# Patient Record
Sex: MALE | Race: WHITE | NOT HISPANIC OR LATINO
[De-identification: names, ages, dates, MRNs, and addresses within clinical notes are randomized per-mention and may not be internally consistent; named-entity substitution may affect disease eponyms.]

---

## 2017-01-06 ENCOUNTER — RX RENEWAL (OUTPATIENT)
Age: 77
End: 2017-01-06

## 2017-01-09 ENCOUNTER — APPOINTMENT (OUTPATIENT)
Dept: INFUSION THERAPY | Facility: CLINIC | Age: 77
End: 2017-01-09

## 2017-01-16 ENCOUNTER — OTHER (OUTPATIENT)
Age: 77
End: 2017-01-16

## 2017-01-16 LAB
BASOPHILS # BLD: 0.03 TH/MM3
BASOPHILS NFR BLD: 0.4 %
EOSINOPHIL # BLD: 0.09 TH/MM3
EOSINOPHIL NFR BLD: 1.3 %
ERYTHROCYTE [DISTWIDTH] IN BLOOD BY AUTOMATED COUNT: 12.7 %
GRANULOCYTES # BLD: 5.08 TH/MM3
GRANULOCYTES NFR BLD: 73.4 %
HCT VFR BLD AUTO: 43.4 %
HGB BLD-MCNC: 15.7 G/DL
IMM GRANULOCYTES # BLD: 0.04 TH/MM3
IMM GRANULOCYTES NFR BLD: 0.6 %
LYMPHOCYTES # BLD: 0.78 TH/MM3
LYMPHOCYTES NFR BLD: 11.3 %
MCH RBC QN AUTO: 44.7 PG
MCHC RBC AUTO-ENTMCNC: 36.2 G/DL
MCV RBC AUTO: 123.6 FL
MONOCYTES # BLD: 0.9 TH/MM3
MONOCYTES NFR BLD: 13 %
PLATELET # BLD: 183 TH/MM3
PMV BLD AUTO: 10.2 FL
RBC # BLD AUTO: 3.51 MIL/MM3
WBC # BLD: 6.92 TH/MM3

## 2017-03-07 ENCOUNTER — APPOINTMENT (OUTPATIENT)
Dept: HEMATOLOGY ONCOLOGY | Facility: CLINIC | Age: 77
End: 2017-03-07

## 2017-03-07 ENCOUNTER — OUTPATIENT (OUTPATIENT)
Dept: OUTPATIENT SERVICES | Facility: HOSPITAL | Age: 77
LOS: 1 days | Discharge: HOME | End: 2017-03-07

## 2017-03-07 VITALS
TEMPERATURE: 97.5 F | HEART RATE: 68 BPM | DIASTOLIC BLOOD PRESSURE: 66 MMHG | BODY MASS INDEX: 27.46 KG/M2 | HEIGHT: 74 IN | RESPIRATION RATE: 14 BRPM | SYSTOLIC BLOOD PRESSURE: 121 MMHG | WEIGHT: 214 LBS

## 2017-03-07 LAB
BASOPHILS # BLD: 0.01 TH/MM3
BASOPHILS NFR BLD: 0.2 %
EOSINOPHIL # BLD: 0.08 TH/MM3
EOSINOPHIL NFR BLD: 1.6 %
ERYTHROCYTE [DISTWIDTH] IN BLOOD BY AUTOMATED COUNT: 12.7 %
GRANULOCYTES # BLD: 3.59 TH/MM3
GRANULOCYTES NFR BLD: 70.5 %
HCT VFR BLD AUTO: 37.7 %
HGB BLD-MCNC: 13.7 G/DL
IMM GRANULOCYTES # BLD: 0.02 TH/MM3
IMM GRANULOCYTES NFR BLD: 0.4 %
LYMPHOCYTES # BLD: 0.79 TH/MM3
LYMPHOCYTES NFR BLD: 15.5 %
MCH RBC QN AUTO: 46.6 PG
MCHC RBC AUTO-ENTMCNC: 36.3 G/DL
MCV RBC AUTO: 128.2 FL
MONOCYTES # BLD: 0.6 TH/MM3
MONOCYTES NFR BLD: 11.8 %
PLATELET # BLD: 283 TH/MM3
PMV BLD AUTO: 9.8 FL
RBC # BLD AUTO: 2.94 MIL/MM3
WBC # BLD: 5.09 TH/MM3

## 2017-03-08 ENCOUNTER — OTHER (OUTPATIENT)
Age: 77
End: 2017-03-08

## 2017-03-08 LAB
ALBUMIN SERPL-MCNC: 3.9 G/DL
ALBUMIN/GLOB SERPL: 1.18
ALP SERPL-CCNC: 67 IU/L
ALT SERPL-CCNC: 19 IU/L
ANION GAP SERPL CALC-SCNC: 13 MEQ/L
AST SERPL-CCNC: 23 IU/L
BILIRUB SERPL-MCNC: 1.1 MG/DL
BUN SERPL-MCNC: 22 MG/DL
BUN/CREAT SERPL: 12.5 %
CALCIUM SERPL-MCNC: 8.9 MG/DL
CHLORIDE SERPL-SCNC: 101 MEQ/L
CO2 SERPL-SCNC: 27 MEQ/L
CREAT SERPL-MCNC: 1.76 MG/DL
FERRITIN SERPL-MCNC: 206 NG/ML
GFR SERPL CREATININE-BSD FRML MDRD: 38
GLUCOSE SERPL-MCNC: 79 MG/DL
POTASSIUM SERPL-SCNC: 4.8 MMOL/L
PROT SERPL-MCNC: 7.2 G/DL
SODIUM SERPL-SCNC: 141 MEQ/L
VIT B12 SERPL-MCNC: 383 PG/ML

## 2017-05-15 ENCOUNTER — APPOINTMENT (OUTPATIENT)
Dept: CARDIOLOGY | Facility: CLINIC | Age: 77
End: 2017-05-15

## 2017-05-28 ENCOUNTER — RESULT REVIEW (OUTPATIENT)
Age: 77
End: 2017-05-28

## 2017-05-30 ENCOUNTER — APPOINTMENT (OUTPATIENT)
Dept: HEMATOLOGY ONCOLOGY | Facility: CLINIC | Age: 77
End: 2017-05-30

## 2017-05-30 ENCOUNTER — OTHER (OUTPATIENT)
Age: 77
End: 2017-05-30

## 2017-05-30 VITALS
HEIGHT: 74 IN | RESPIRATION RATE: 14 BRPM | TEMPERATURE: 96.6 F | DIASTOLIC BLOOD PRESSURE: 90 MMHG | BODY MASS INDEX: 26.95 KG/M2 | HEART RATE: 63 BPM | WEIGHT: 210 LBS | SYSTOLIC BLOOD PRESSURE: 141 MMHG

## 2017-05-30 LAB
BASOPHILS # BLD: 0.02 TH/MM3
BASOPHILS NFR BLD: 0.4 %
EOSINOPHIL # BLD: 0.13 TH/MM3
EOSINOPHIL NFR BLD: 2.3 %
ERYTHROCYTE [DISTWIDTH] IN BLOOD BY AUTOMATED COUNT: 11.2 %
GRANULOCYTES # BLD: 4.21 TH/MM3
GRANULOCYTES NFR BLD: 74 %
HCT VFR BLD AUTO: 42.4 %
HGB BLD-MCNC: 15.5 G/DL
IMM GRANULOCYTES # BLD: 0.04 TH/MM3
IMM GRANULOCYTES NFR BLD: 0.7 %
LYMPHOCYTES # BLD: 0.72 TH/MM3
LYMPHOCYTES NFR BLD: 12.7 %
MCH RBC QN AUTO: 46 PG
MCHC RBC AUTO-ENTMCNC: 36.6 G/DL
MCV RBC AUTO: 125.8 FL
MONOCYTES # BLD: 0.56 TH/MM3
MONOCYTES NFR BLD: 9.9 %
PLATELET # BLD: 272 TH/MM3
PMV BLD AUTO: 9.8 FL
RBC # BLD AUTO: 3.37 MIL/MM3
WBC # BLD: 5.68 TH/MM3

## 2017-06-02 ENCOUNTER — APPOINTMENT (OUTPATIENT)
Dept: INFUSION THERAPY | Facility: CLINIC | Age: 77
End: 2017-06-02

## 2017-06-02 ENCOUNTER — OUTPATIENT (OUTPATIENT)
Dept: OUTPATIENT SERVICES | Facility: HOSPITAL | Age: 77
LOS: 1 days | Discharge: HOME | End: 2017-06-02

## 2017-06-05 LAB
HEMOCCULT SP1 STL QL: NEGATIVE
HEMOCCULT SP2 STL QL: NEGATIVE
HEMOCCULT SP3 STL QL: NEGATIVE

## 2017-06-12 ENCOUNTER — EMERGENCY (EMERGENCY)
Facility: HOSPITAL | Age: 77
LOS: 0 days | Discharge: HOME | End: 2017-06-12

## 2017-06-28 DIAGNOSIS — R10.32 LEFT LOWER QUADRANT PAIN: ICD-10-CM

## 2017-06-28 DIAGNOSIS — Z79.82 LONG TERM (CURRENT) USE OF ASPIRIN: ICD-10-CM

## 2017-06-28 DIAGNOSIS — K52.9 NONINFECTIVE GASTROENTERITIS AND COLITIS, UNSPECIFIED: ICD-10-CM

## 2017-06-28 DIAGNOSIS — Z79.02 LONG TERM (CURRENT) USE OF ANTITHROMBOTICS/ANTIPLATELETS: ICD-10-CM

## 2017-06-28 DIAGNOSIS — E83.119 HEMOCHROMATOSIS, UNSPECIFIED: ICD-10-CM

## 2017-06-28 DIAGNOSIS — Z79.899 OTHER LONG TERM (CURRENT) DRUG THERAPY: ICD-10-CM

## 2017-07-17 ENCOUNTER — OUTPATIENT (OUTPATIENT)
Dept: OUTPATIENT SERVICES | Facility: HOSPITAL | Age: 77
LOS: 1 days | Discharge: HOME | End: 2017-07-17

## 2017-07-17 DIAGNOSIS — R19.7 DIARRHEA, UNSPECIFIED: ICD-10-CM

## 2017-10-12 ENCOUNTER — RX RENEWAL (OUTPATIENT)
Age: 77
End: 2017-10-12

## 2017-10-24 ENCOUNTER — OUTPATIENT (OUTPATIENT)
Dept: OUTPATIENT SERVICES | Facility: HOSPITAL | Age: 77
LOS: 1 days | Discharge: HOME | End: 2017-10-24

## 2017-10-24 DIAGNOSIS — E78.5 HYPERLIPIDEMIA, UNSPECIFIED: ICD-10-CM

## 2017-10-30 ENCOUNTER — APPOINTMENT (OUTPATIENT)
Dept: HEMATOLOGY ONCOLOGY | Facility: CLINIC | Age: 77
End: 2017-10-30

## 2017-10-30 ENCOUNTER — OUTPATIENT (OUTPATIENT)
Dept: OUTPATIENT SERVICES | Facility: HOSPITAL | Age: 77
LOS: 1 days | Discharge: HOME | End: 2017-10-30

## 2017-10-30 VITALS
WEIGHT: 203 LBS | TEMPERATURE: 97.7 F | DIASTOLIC BLOOD PRESSURE: 73 MMHG | BODY MASS INDEX: 26.05 KG/M2 | HEART RATE: 59 BPM | RESPIRATION RATE: 14 BRPM | SYSTOLIC BLOOD PRESSURE: 165 MMHG | HEIGHT: 74 IN

## 2017-10-31 DIAGNOSIS — E83.119 HEMOCHROMATOSIS, UNSPECIFIED: ICD-10-CM

## 2018-04-04 DIAGNOSIS — E83.119 HEMOCHROMATOSIS, UNSPECIFIED: ICD-10-CM

## 2018-04-24 ENCOUNTER — APPOINTMENT (OUTPATIENT)
Dept: HEMATOLOGY ONCOLOGY | Facility: CLINIC | Age: 78
End: 2018-04-24

## 2018-04-24 ENCOUNTER — LABORATORY RESULT (OUTPATIENT)
Age: 78
End: 2018-04-24

## 2018-04-24 VITALS
SYSTOLIC BLOOD PRESSURE: 138 MMHG | DIASTOLIC BLOOD PRESSURE: 68 MMHG | TEMPERATURE: 97 F | HEART RATE: 62 BPM | RESPIRATION RATE: 14 BRPM

## 2018-04-24 LAB
HCT VFR BLD CALC: 41.9 %
HGB BLD-MCNC: 14.5 G/DL
MCHC RBC-ENTMCNC: 34.6 G/DL
MCHC RBC-ENTMCNC: 42.3 PG
MCV RBC AUTO: 122.2 FL
PLATELET # BLD AUTO: 723 K/UL
PMV BLD: 9.1 FL
RBC # BLD: 3.43 M/UL
RBC # FLD: 12.2 %
WBC # FLD AUTO: 6.64 K/UL

## 2018-04-26 LAB — FERRITIN SERPL-MCNC: 341 NG/ML

## 2018-04-30 ENCOUNTER — OUTPATIENT (OUTPATIENT)
Dept: OUTPATIENT SERVICES | Facility: HOSPITAL | Age: 78
LOS: 1 days | Discharge: HOME | End: 2018-04-30

## 2018-04-30 DIAGNOSIS — E83.119 HEMOCHROMATOSIS, UNSPECIFIED: ICD-10-CM

## 2018-05-30 ENCOUNTER — OUTPATIENT (OUTPATIENT)
Dept: OUTPATIENT SERVICES | Facility: HOSPITAL | Age: 78
LOS: 1 days | Discharge: HOME | End: 2018-05-30

## 2018-05-30 DIAGNOSIS — E83.119 HEMOCHROMATOSIS, UNSPECIFIED: ICD-10-CM

## 2018-06-12 ENCOUNTER — RX RENEWAL (OUTPATIENT)
Age: 78
End: 2018-06-12

## 2018-06-26 ENCOUNTER — LABORATORY RESULT (OUTPATIENT)
Age: 78
End: 2018-06-26

## 2018-06-26 ENCOUNTER — APPOINTMENT (OUTPATIENT)
Dept: HEMATOLOGY ONCOLOGY | Facility: CLINIC | Age: 78
End: 2018-06-26

## 2018-06-26 ENCOUNTER — OUTPATIENT (OUTPATIENT)
Dept: OUTPATIENT SERVICES | Facility: HOSPITAL | Age: 78
LOS: 1 days | Discharge: HOME | End: 2018-06-26

## 2018-06-26 ENCOUNTER — OTHER (OUTPATIENT)
Age: 78
End: 2018-06-26

## 2018-06-26 VITALS
SYSTOLIC BLOOD PRESSURE: 127 MMHG | HEIGHT: 74 IN | WEIGHT: 207 LBS | HEART RATE: 64 BPM | BODY MASS INDEX: 26.56 KG/M2 | DIASTOLIC BLOOD PRESSURE: 65 MMHG | RESPIRATION RATE: 14 BRPM | TEMPERATURE: 98.1 F

## 2018-06-26 DIAGNOSIS — E83.119 HEMOCHROMATOSIS, UNSPECIFIED: ICD-10-CM

## 2018-06-27 LAB
FERRITIN SERPL-MCNC: 98 NG/ML
HCT VFR BLD CALC: 43.8 %
HGB BLD-MCNC: 15.3 G/DL
MCHC RBC-ENTMCNC: 34.9 G/DL
MCHC RBC-ENTMCNC: 42.7 PG
MCV RBC AUTO: 122.3 FL
PLATELET # BLD AUTO: 591 K/UL
PMV BLD: 9.7 FL
RBC # BLD: 3.58 M/UL
RBC # FLD: 13.2 %
WBC # FLD AUTO: 5.5 K/UL

## 2018-07-11 ENCOUNTER — OUTPATIENT (OUTPATIENT)
Dept: OUTPATIENT SERVICES | Facility: HOSPITAL | Age: 78
LOS: 1 days | Discharge: HOME | End: 2018-07-11

## 2018-07-11 DIAGNOSIS — Z00.00 ENCOUNTER FOR GENERAL ADULT MEDICAL EXAMINATION WITHOUT ABNORMAL FINDINGS: ICD-10-CM

## 2018-09-17 ENCOUNTER — LABORATORY RESULT (OUTPATIENT)
Age: 78
End: 2018-09-17

## 2018-09-17 ENCOUNTER — APPOINTMENT (OUTPATIENT)
Dept: HEMATOLOGY ONCOLOGY | Facility: CLINIC | Age: 78
End: 2018-09-17

## 2018-09-17 ENCOUNTER — OUTPATIENT (OUTPATIENT)
Dept: OUTPATIENT SERVICES | Facility: HOSPITAL | Age: 78
LOS: 1 days | Discharge: HOME | End: 2018-09-17

## 2018-09-17 VITALS
HEART RATE: 65 BPM | RESPIRATION RATE: 14 BRPM | TEMPERATURE: 96.9 F | DIASTOLIC BLOOD PRESSURE: 74 MMHG | HEIGHT: 74 IN | WEIGHT: 207 LBS | BODY MASS INDEX: 26.56 KG/M2 | SYSTOLIC BLOOD PRESSURE: 131 MMHG

## 2018-09-18 DIAGNOSIS — E83.119 HEMOCHROMATOSIS, UNSPECIFIED: ICD-10-CM

## 2018-09-18 LAB
HCT VFR BLD CALC: 43.6 %
HGB BLD-MCNC: 15.4 G/DL
INR PPP: 1.1 RATIO
MCHC RBC-ENTMCNC: 35.3 G/DL
MCHC RBC-ENTMCNC: 42.8 PG
MCV RBC AUTO: 121.1 FL
PLATELET # BLD AUTO: 563 K/UL
PMV BLD: 9.7 FL
PT BLD: 11.9 SEC
RBC # BLD: 3.6 M/UL
RBC # FLD: 12.6 %
WBC # FLD AUTO: 6.15 K/UL

## 2018-12-18 ENCOUNTER — LABORATORY RESULT (OUTPATIENT)
Age: 78
End: 2018-12-18

## 2018-12-18 ENCOUNTER — APPOINTMENT (OUTPATIENT)
Dept: HEMATOLOGY ONCOLOGY | Facility: CLINIC | Age: 78
End: 2018-12-18

## 2018-12-18 VITALS
HEART RATE: 73 BPM | WEIGHT: 200 LBS | RESPIRATION RATE: 14 BRPM | TEMPERATURE: 97.1 F | BODY MASS INDEX: 25.67 KG/M2 | DIASTOLIC BLOOD PRESSURE: 74 MMHG | HEIGHT: 74 IN | SYSTOLIC BLOOD PRESSURE: 131 MMHG

## 2018-12-18 LAB
HCT VFR BLD CALC: 42.8 %
HGB BLD-MCNC: 15.5 G/DL
MCHC RBC-ENTMCNC: 36.2 G/DL
MCHC RBC-ENTMCNC: 43.9 PG
MCV RBC AUTO: 121.2 FL
PLATELET # BLD AUTO: 491 K/UL
PMV BLD: 10 FL
RBC # BLD: 3.53 M/UL
RBC # FLD: 13.2 %
WBC # FLD AUTO: 5.03 K/UL

## 2018-12-18 NOTE — REVIEW OF SYSTEMS
[Chest Pain] : chest pain [SOB on Exertion] : shortness of breath during exertion [Joint Pain] : joint pain [Dizziness] : dizziness [Negative] : Psychiatric

## 2018-12-19 LAB — FERRITIN SERPL-MCNC: 177 NG/ML

## 2018-12-19 NOTE — ASSESSMENT
[FreeTextEntry1] : 77 yo M with Essential thrombocythemia controlled with hydrea and hereditary hemochromatosis on periodic  phlebotomy \par \par PLAN:\par Essential Thrombocytosis:\par -Platelet :491 \par - Will alternate hydrea 1500mg one day and 2000mg the next \par \par Hereditary Hemochromatosis:\par - last phlebotomy 4-5 months ago\par - Last ferritin 98\par -Will check ferritin repeat \par - Will schedule possible Phlebotomy in 2-3 months based on ferritin\par

## 2018-12-19 NOTE — HISTORY OF PRESENT ILLNESS
[de-identified] : \par 76/M presenting for follow-up for essential thrombocythemia and hemochromatosis. He is on Hydrea 1500 mg daily, tolerating well. His last phlebotomy was in March 2017. \par He has History of DJD s/p knee arthroplasty , chronic shoulder pain, CAD , aortic stenosis , CHF , positional vertigo worse when turning in bed. hypertension, regimen changed in May2017 .  [de-identified] : The patient returns for follow-up , he complains of right shoulder pain with decreased range of motion. Still with episodes of positional vertigo at night . ferritin was 200 in March prior to last phlebotomy. \par \par 06/26/2018 : Patient returns for follow up , he had phlebotomy twice after last visit . He reports sensation of fullness ( described as bubble ) in  epigastric and lower sternum , unrelated to food or exertion.\par \par 9/17/18:\par Patient here for follow up \par Occasional knee and hip pain had bilateral knee replacement \par Has dizziness which is chronic and has been seen by a cardiologist- is positional\par He has chest pain which is chronic, shortness of breath on exertion \par Patient denies any headache, blurred vision \par Last phlebotomy 3 months ago \par \par 12/18/18\par Patient here for follow up \par Continues to have positional dizziness, and chronic hip/knee, and chronic chest pain which has been evaluated by cardiologist \par No headache, blurred vision, pruritus after showering\par Last phlebotomy 4-5 months ago, feels well

## 2019-01-15 ENCOUNTER — RX RENEWAL (OUTPATIENT)
Age: 79
End: 2019-01-15

## 2019-03-12 ENCOUNTER — APPOINTMENT (OUTPATIENT)
Dept: HEMATOLOGY ONCOLOGY | Facility: CLINIC | Age: 79
End: 2019-03-12

## 2019-03-12 ENCOUNTER — LABORATORY RESULT (OUTPATIENT)
Age: 79
End: 2019-03-12

## 2019-03-12 ENCOUNTER — OUTPATIENT (OUTPATIENT)
Dept: OUTPATIENT SERVICES | Facility: HOSPITAL | Age: 79
LOS: 1 days | Discharge: HOME | End: 2019-03-12

## 2019-03-12 VITALS
DIASTOLIC BLOOD PRESSURE: 82 MMHG | TEMPERATURE: 96 F | SYSTOLIC BLOOD PRESSURE: 141 MMHG | WEIGHT: 202 LBS | RESPIRATION RATE: 14 BRPM | BODY MASS INDEX: 25.93 KG/M2 | HEIGHT: 74 IN | HEART RATE: 64 BPM

## 2019-03-12 DIAGNOSIS — E83.119 HEMOCHROMATOSIS, UNSPECIFIED: ICD-10-CM

## 2019-03-12 LAB
HCT VFR BLD CALC: 41 %
HGB BLD-MCNC: 14.8 G/DL
MCHC RBC-ENTMCNC: 36.1 G/DL
MCHC RBC-ENTMCNC: 47 PG
MCV RBC AUTO: 130.2 FL
PLATELET # BLD AUTO: 379 K/UL
PMV BLD: 9.5 FL
RBC # BLD: 3.15 M/UL
RBC # FLD: 13.7 %
WBC # FLD AUTO: 4.73 K/UL

## 2019-03-12 NOTE — ASSESSMENT
[FreeTextEntry1] : Essential thrombocythemia controlled with hydrea , hereditary hemochromatosis phlebotomy , platelets are normal . , will check ferritin , continue same dose of hydrea . follow up with ortho and cardiology . .

## 2019-03-12 NOTE — PHYSICAL EXAM
[Normal] : normoactive bowel sounds, soft and nontender, no hepatosplenomegaly or masses appreciated [de-identified] : grade 2/6 systolic murmur [de-identified] : no organomegaly. no tenderness.

## 2019-03-12 NOTE — HISTORY OF PRESENT ILLNESS
[de-identified] : \par 76/M presenting for follow-up for essential thrombocythemia and hemochromatosis. He is on Hydrea 1500 mg daily, tolerating well. His last phlebotomy was in March 2017. \par He has History of DJD s/p knee arthroplasty , chronic shoulder pain, CAD , aortic stenosis , CHF , positional vertigo worse when turning in bed. hypertension, regimen changed in May2017 .  [de-identified] : The patient returns for follow-up , he complains of right shoulder pain with decreased range of motion. Still with episodes of positional vertigo at night . ferritin was 200 in March prior to last phlebotomy. \par \par 06/26/2018 : Patient returns for follow up , he had phlebotomy twice after last visit . He reports sensation of fullness ( described as bubble ) in  epigastric and lower sternum , unrelated to food or exertion.\par \par 03/12/2019 Patient returns for follow up , CBC is normal . he denies any new complaints except for asymptomatic sinus bradycardia , feels he is not ready for pacemaker , also mild knee pain , to follow with ortho.

## 2019-03-13 LAB — FERRITIN SERPL-MCNC: 236 NG/ML

## 2019-04-11 ENCOUNTER — OUTPATIENT (OUTPATIENT)
Dept: OUTPATIENT SERVICES | Facility: HOSPITAL | Age: 79
LOS: 1 days | Discharge: HOME | End: 2019-04-11

## 2019-04-11 DIAGNOSIS — I10 ESSENTIAL (PRIMARY) HYPERTENSION: ICD-10-CM

## 2019-04-11 DIAGNOSIS — D47.3 ESSENTIAL (HEMORRHAGIC) THROMBOCYTHEMIA: ICD-10-CM

## 2019-04-11 DIAGNOSIS — E78.5 HYPERLIPIDEMIA, UNSPECIFIED: ICD-10-CM

## 2019-04-11 DIAGNOSIS — Z00.00 ENCOUNTER FOR GENERAL ADULT MEDICAL EXAMINATION WITHOUT ABNORMAL FINDINGS: ICD-10-CM

## 2019-06-11 ENCOUNTER — APPOINTMENT (OUTPATIENT)
Dept: HEMATOLOGY ONCOLOGY | Facility: CLINIC | Age: 79
End: 2019-06-11
Payer: COMMERCIAL

## 2019-06-11 ENCOUNTER — LABORATORY RESULT (OUTPATIENT)
Age: 79
End: 2019-06-11

## 2019-06-11 VITALS
HEIGHT: 74 IN | SYSTOLIC BLOOD PRESSURE: 111 MMHG | TEMPERATURE: 96.3 F | BODY MASS INDEX: 25.93 KG/M2 | RESPIRATION RATE: 14 BRPM | DIASTOLIC BLOOD PRESSURE: 71 MMHG | WEIGHT: 202 LBS | HEART RATE: 59 BPM

## 2019-06-11 DIAGNOSIS — R21 RASH AND OTHER NONSPECIFIC SKIN ERUPTION: ICD-10-CM

## 2019-06-11 DIAGNOSIS — R59.0 LOCALIZED ENLARGED LYMPH NODES: ICD-10-CM

## 2019-06-11 LAB
HCT VFR BLD CALC: 40.7 %
HGB BLD-MCNC: 14.9 G/DL
MCHC RBC-ENTMCNC: 36.6 G/DL
MCHC RBC-ENTMCNC: 47.2 PG
MCV RBC AUTO: 128.8 FL
PLATELET # BLD AUTO: 255 K/UL
PMV BLD: 10 FL
RBC # BLD: 3.16 M/UL
RBC # FLD: 12.6 %
WBC # FLD AUTO: 4.95 K/UL

## 2019-06-11 PROCEDURE — 99213 OFFICE O/P EST LOW 20 MIN: CPT

## 2019-06-11 NOTE — ASSESSMENT
[FreeTextEntry1] : Essential thrombocythemia controlled with hydrea \par Advised to take 3 pills daily.\par CBC today showed stable Plt count. \par Continue ASA 81mg.\par \par Hereditary hemochromatosis. Ferritin 236 in 03/2019. Will do 1 unit phlebotomy. \par \par Prescribed steroid cream for poison ivy on arms. \par \par CT C/A/P prescription given for h/o non specific abdominal lymphadenopathy and lung nodule. \par \par RTC in 3 months.\par Pt seen and examined with \par

## 2019-06-11 NOTE — PHYSICAL EXAM
[Normal] : normoactive bowel sounds, soft and nontender, no hepatosplenomegaly or masses appreciated [de-identified] : grade 2/6 systolic murmur [de-identified] : no organomegaly. no tenderness.

## 2019-06-11 NOTE — HISTORY OF PRESENT ILLNESS
[de-identified] : \par 76/M presenting for follow-up for essential thrombocythemia and hemochromatosis. He is on Hydrea 1500 mg daily, tolerating well. His last phlebotomy was in March 2017. \par He has History of DJD s/p knee arthroplasty , chronic shoulder pain, CAD , aortic stenosis , CHF , positional vertigo worse when turning in bed. hypertension, regimen changed in May2017 .  [de-identified] : The patient returns for follow-up , he complains of right shoulder pain with decreased range of motion. Still with episodes of positional vertigo at night . ferritin was 200 in March prior to last phlebotomy. \par \par 06/26/2018 : Patient returns for follow up , he had phlebotomy twice after last visit . He reports sensation of fullness ( described as bubble ) in  epigastric and lower sternum , unrelated to food or exertion.\par \par 03/12/2019 Patient returns for follow up , CBC is normal . he denies any new complaints except for asymptomatic sinus bradycardia , feels he is not ready for pacemaker , also mild knee pain , to follow with ortho. \par \par 6/11/19;\par On Hydrea 3 pills alternating with 4 pills\par On ASA 81mg\par Last phlebotomy 6 months ago.\par Doing well. \par Had poison ivy on arms. \par Ferritin 236 in 03/2019

## 2019-06-14 ENCOUNTER — APPOINTMENT (OUTPATIENT)
Dept: INFUSION THERAPY | Facility: CLINIC | Age: 79
End: 2019-06-14

## 2019-08-26 ENCOUNTER — RX RENEWAL (OUTPATIENT)
Age: 79
End: 2019-08-26

## 2019-09-10 ENCOUNTER — APPOINTMENT (OUTPATIENT)
Dept: HEMATOLOGY ONCOLOGY | Facility: CLINIC | Age: 79
End: 2019-09-10
Payer: COMMERCIAL

## 2019-09-10 ENCOUNTER — LABORATORY RESULT (OUTPATIENT)
Age: 79
End: 2019-09-10

## 2019-09-10 VITALS
HEIGHT: 73 IN | RESPIRATION RATE: 14 BRPM | BODY MASS INDEX: 26.77 KG/M2 | HEART RATE: 60 BPM | SYSTOLIC BLOOD PRESSURE: 147 MMHG | DIASTOLIC BLOOD PRESSURE: 69 MMHG | WEIGHT: 202 LBS

## 2019-09-10 VITALS — TEMPERATURE: 97.8 F

## 2019-09-10 DIAGNOSIS — Z00.00 ENCOUNTER FOR GENERAL ADULT MEDICAL EXAMINATION W/OUT ABNORMAL FINDINGS: ICD-10-CM

## 2019-09-10 PROCEDURE — 99214 OFFICE O/P EST MOD 30 MIN: CPT

## 2019-09-10 NOTE — ASSESSMENT
[FreeTextEntry1] : Essential thrombocythemia controlled with hydrea , hereditary hemochromatosis phlebotomy , CBc is normal . continue same dose of hydrea. repeat ferritin . \par CT chest and abdomen for follow up on abnormal lymph nodes and lung nodules. \par return in 4 months .

## 2019-09-10 NOTE — HISTORY OF PRESENT ILLNESS
[de-identified] : \par 76/M presenting for follow-up for essential thrombocythemia and hemochromatosis. He is on Hydrea 1500 mg daily, tolerating well. His last phlebotomy was in March 2017. \par He has History of DJD s/p knee arthroplasty , chronic shoulder pain, CAD , aortic stenosis , CHF , positional vertigo worse when turning in bed. hypertension, regimen changed in May2017 .  [de-identified] : The patient returns for follow-up , he complains of right shoulder pain with decreased range of motion. Still with episodes of positional vertigo at night . ferritin was 200 in March prior to last phlebotomy. \par \par 06/26/2018 : Patient returns for follow up , he had phlebotomy twice after last visit . He reports sensation of fullness ( described as bubble ) in  epigastric and lower sternum , unrelated to food or exertion.\par \par 03/12/2019 Patient returns for follow up , CBC is normal . he denies any new complaints except for asymptomatic sinus bradycardia , feels he is not ready for pacemaker , also mild knee pain , to follow with ortho. \par \par \par 09/10/2019 Patient returns for follow up , last phlebotomy in June . he offers no new complaints . he continues with easy bruising and is stable from cardia standpoint .

## 2019-09-10 NOTE — PHYSICAL EXAM
[Normal] : clear to auscultation bilaterally, no dullness, no wheezing [de-identified] : grade 2/6 systolic murmur [de-identified] : no organomegaly. no tenderness.

## 2019-09-11 LAB
ALBUMIN SERPL ELPH-MCNC: 4.5 G/DL
ALP BLD-CCNC: 68 U/L
ALT SERPL-CCNC: 16 U/L
ANION GAP SERPL CALC-SCNC: 14 MMOL/L
AST SERPL-CCNC: 23 U/L
BILIRUB SERPL-MCNC: 0.7 MG/DL
BUN SERPL-MCNC: 23 MG/DL
CALCIUM SERPL-MCNC: 9.1 MG/DL
CHLORIDE SERPL-SCNC: 103 MMOL/L
CO2 SERPL-SCNC: 24 MMOL/L
CREAT SERPL-MCNC: 1.5 MG/DL
FERRITIN SERPL-MCNC: 270 NG/ML
GLUCOSE SERPL-MCNC: 83 MG/DL
HCT VFR BLD CALC: 39 %
HGB BLD-MCNC: 14.4 G/DL
MCHC RBC-ENTMCNC: 36.9 G/DL
MCHC RBC-ENTMCNC: 48.6 PG
MCV RBC AUTO: 131.8 FL
PLATELET # BLD AUTO: 239 K/UL
PMV BLD: 10.5 FL
POTASSIUM SERPL-SCNC: 5.6 MMOL/L
PROT SERPL-MCNC: 7.6 G/DL
RBC # BLD: 2.96 M/UL
RBC # FLD: 12.3 %
SODIUM SERPL-SCNC: 141 MMOL/L
WBC # FLD AUTO: 4.62 K/UL

## 2019-09-17 ENCOUNTER — APPOINTMENT (OUTPATIENT)
Dept: INFUSION THERAPY | Facility: CLINIC | Age: 79
End: 2019-09-17

## 2019-09-17 ENCOUNTER — LABORATORY RESULT (OUTPATIENT)
Age: 79
End: 2019-09-17

## 2019-09-17 ENCOUNTER — OUTPATIENT (OUTPATIENT)
Dept: OUTPATIENT SERVICES | Facility: HOSPITAL | Age: 79
LOS: 1 days | Discharge: HOME | End: 2019-09-17

## 2019-09-17 DIAGNOSIS — R21 RASH AND OTHER NONSPECIFIC SKIN ERUPTION: ICD-10-CM

## 2019-09-17 DIAGNOSIS — R59.0 LOCALIZED ENLARGED LYMPH NODES: ICD-10-CM

## 2019-09-17 DIAGNOSIS — E83.119 HEMOCHROMATOSIS, UNSPECIFIED: ICD-10-CM

## 2019-09-17 DIAGNOSIS — E83.110 HEREDITARY HEMOCHROMATOSIS: ICD-10-CM

## 2019-09-17 DIAGNOSIS — R91.1 SOLITARY PULMONARY NODULE: ICD-10-CM

## 2019-09-17 LAB
HCT VFR BLD CALC: 39.5 %
HGB BLD-MCNC: 14.5 G/DL
MCHC RBC-ENTMCNC: 36.7 G/DL
MCHC RBC-ENTMCNC: 48.2 PG
MCV RBC AUTO: 131.2 FL
PLATELET # BLD AUTO: 293 K/UL
PMV BLD: 10.4 FL
RBC # BLD: 3.01 M/UL
RBC # FLD: 12.5 %
WBC # FLD AUTO: 3.92 K/UL

## 2019-09-23 ENCOUNTER — FORM ENCOUNTER (OUTPATIENT)
Age: 79
End: 2019-09-23

## 2019-09-24 ENCOUNTER — OUTPATIENT (OUTPATIENT)
Dept: OUTPATIENT SERVICES | Facility: HOSPITAL | Age: 79
LOS: 1 days | Discharge: HOME | End: 2019-09-24
Payer: COMMERCIAL

## 2019-09-24 DIAGNOSIS — E83.110 HEREDITARY HEMOCHROMATOSIS: ICD-10-CM

## 2019-09-24 DIAGNOSIS — D47.3 ESSENTIAL (HEMORRHAGIC) THROMBOCYTHEMIA: ICD-10-CM

## 2019-09-24 PROCEDURE — 74177 CT ABD & PELVIS W/CONTRAST: CPT | Mod: 26

## 2019-09-24 PROCEDURE — 71260 CT THORAX DX C+: CPT | Mod: 26

## 2019-10-24 ENCOUNTER — OUTPATIENT (OUTPATIENT)
Dept: OUTPATIENT SERVICES | Facility: HOSPITAL | Age: 79
LOS: 1 days | Discharge: HOME | End: 2019-10-24

## 2019-10-24 DIAGNOSIS — N18.2 CHRONIC KIDNEY DISEASE, STAGE 2 (MILD): ICD-10-CM

## 2019-10-24 DIAGNOSIS — E78.5 HYPERLIPIDEMIA, UNSPECIFIED: ICD-10-CM

## 2019-10-24 DIAGNOSIS — I25.10 ATHEROSCLEROTIC HEART DISEASE OF NATIVE CORONARY ARTERY WITHOUT ANGINA PECTORIS: ICD-10-CM

## 2020-01-13 ENCOUNTER — LABORATORY RESULT (OUTPATIENT)
Age: 80
End: 2020-01-13

## 2020-01-13 ENCOUNTER — APPOINTMENT (OUTPATIENT)
Dept: HEMATOLOGY ONCOLOGY | Facility: CLINIC | Age: 80
End: 2020-01-13
Payer: COMMERCIAL

## 2020-01-13 ENCOUNTER — OUTPATIENT (OUTPATIENT)
Dept: OUTPATIENT SERVICES | Facility: HOSPITAL | Age: 80
LOS: 1 days | Discharge: HOME | End: 2020-01-13

## 2020-01-13 VITALS
DIASTOLIC BLOOD PRESSURE: 73 MMHG | BODY MASS INDEX: 26.51 KG/M2 | HEIGHT: 73 IN | HEART RATE: 71 BPM | SYSTOLIC BLOOD PRESSURE: 121 MMHG | WEIGHT: 200 LBS | TEMPERATURE: 96.3 F

## 2020-01-13 LAB
HCT VFR BLD CALC: 40 %
HGB BLD-MCNC: 14.4 G/DL
MCHC RBC-ENTMCNC: 36 G/DL
MCHC RBC-ENTMCNC: 47.2 PG
MCV RBC AUTO: 131.1 FL
PLATELET # BLD AUTO: 325 K/UL
PMV BLD: 9.6 FL
RBC # BLD: 3.05 M/UL
RBC # FLD: 13.3 %
WBC # FLD AUTO: 5.52 K/UL

## 2020-01-13 PROCEDURE — 99213 OFFICE O/P EST LOW 20 MIN: CPT

## 2020-01-14 LAB — FERRITIN SERPL-MCNC: 235 NG/ML

## 2020-01-14 NOTE — HISTORY OF PRESENT ILLNESS
[de-identified] : \par 76/M presenting for follow-up for essential thrombocythemia and hemochromatosis. He is on Hydrea 1500 mg daily, tolerating well. His last phlebotomy was in March 2017. \par He has History of DJD s/p knee arthroplasty , chronic shoulder pain, CAD , aortic stenosis , CHF , positional vertigo worse when turning in bed. hypertension, regimen changed in May2017 .  [de-identified] : The patient returns for follow-up , he complains of right shoulder pain with decreased range of motion. Still with episodes of positional vertigo at night . ferritin was 200 in March prior to last phlebotomy. \par \par 06/26/2018 : Patient returns for follow up , he had phlebotomy twice after last visit . He reports sensation of fullness ( described as bubble ) in  epigastric and lower sternum , unrelated to food or exertion.\par \par 03/12/2019 Patient returns for follow up , CBC is normal . he denies any new complaints except for asymptomatic sinus bradycardia , feels he is not ready for pacemaker , also mild knee pain , to follow with ortho. \par \par \par 09/10/2019 Patient returns for follow up , last phlebotomy in June . he offers no new complaints . he continues with easy bruising and is stable from cardia standpoint . \par \par 01/13/2020\par Patient returns for a follow up visit. His last phlebotomy was in September 2019. He had a mechanical fall  4 weeks ago and had left hip fracture. Was told no need for surgical intervention. He is in pain and is getting PT. His latest CBC shows HB of 14.4 with HCT of 40 and Platelets of 325. \par Tolerating Hydrea well.

## 2020-01-14 NOTE — PHYSICAL EXAM
[Normal] : normoactive bowel sounds, soft and nontender, no hepatosplenomegaly or masses appreciated [de-identified] : grade 2/6 systolic murmur [de-identified] : no organomegaly. no tenderness.

## 2020-01-14 NOTE — ASSESSMENT
[FreeTextEntry1] : Essential thrombocythemia controlled with hydrea , hereditary hemochromatosis  on periodic phlebotomy ,\par \par  Plan \par CBC reviewed today and is normal . continue same dose of Hydrea. 1000 mg alternating with 1500 mg daily. \par  Repeat ferritin. Based on ferritin will decide if he needs phlebotomy or not. \par CT chest and abdomen revealed no intrabdominal lymphadenopathy. \par Follow up with orthopedics for left Hip fracture\par Return in 6 months. \par \par Patient seen and examined with Dr. Herrera

## 2020-01-15 DIAGNOSIS — D47.3 ESSENTIAL (HEMORRHAGIC) THROMBOCYTHEMIA: ICD-10-CM

## 2020-01-15 DIAGNOSIS — E83.110 HEREDITARY HEMOCHROMATOSIS: ICD-10-CM

## 2020-08-11 ENCOUNTER — OUTPATIENT (OUTPATIENT)
Dept: OUTPATIENT SERVICES | Facility: HOSPITAL | Age: 80
LOS: 1 days | Discharge: HOME | End: 2020-08-11

## 2020-08-11 DIAGNOSIS — Z11.59 ENCOUNTER FOR SCREENING FOR OTHER VIRAL DISEASES: ICD-10-CM

## 2020-08-14 ENCOUNTER — OUTPATIENT (OUTPATIENT)
Dept: OUTPATIENT SERVICES | Facility: HOSPITAL | Age: 80
LOS: 1 days | Discharge: HOME | End: 2020-08-14
Payer: COMMERCIAL

## 2020-08-14 DIAGNOSIS — S99.919A UNSPECIFIED INJURY OF UNSPECIFIED ANKLE, INITIAL ENCOUNTER: Chronic | ICD-10-CM

## 2020-08-14 DIAGNOSIS — Z96.653 PRESENCE OF ARTIFICIAL KNEE JOINT, BILATERAL: Chronic | ICD-10-CM

## 2020-08-14 LAB
ANION GAP SERPL CALC-SCNC: 12 MMOL/L — SIGNIFICANT CHANGE UP (ref 7–14)
BUN SERPL-MCNC: 31 MG/DL — HIGH (ref 10–20)
CALCIUM SERPL-MCNC: 8.8 MG/DL — SIGNIFICANT CHANGE UP (ref 8.5–10.1)
CHLORIDE SERPL-SCNC: 104 MMOL/L — SIGNIFICANT CHANGE UP (ref 98–110)
CO2 SERPL-SCNC: 21 MMOL/L — SIGNIFICANT CHANGE UP (ref 17–32)
CREAT SERPL-MCNC: 1.6 MG/DL — HIGH (ref 0.7–1.5)
GLUCOSE SERPL-MCNC: 111 MG/DL — HIGH (ref 70–99)
HCT VFR BLD CALC: 36.8 % — LOW (ref 42–52)
HGB BLD-MCNC: 13.5 G/DL — LOW (ref 14–18)
MCHC RBC-ENTMCNC: 36.7 G/DL — SIGNIFICANT CHANGE UP (ref 32–37)
MCHC RBC-ENTMCNC: 51.5 PG — HIGH (ref 27–31)
MCV RBC AUTO: 140.5 FL — HIGH (ref 80–94)
NRBC # BLD: 0 /100 WBCS — SIGNIFICANT CHANGE UP (ref 0–0)
PLATELET # BLD AUTO: 281 K/UL — SIGNIFICANT CHANGE UP (ref 130–400)
POTASSIUM SERPL-MCNC: 4.6 MMOL/L — SIGNIFICANT CHANGE UP (ref 3.5–5)
POTASSIUM SERPL-SCNC: 4.6 MMOL/L — SIGNIFICANT CHANGE UP (ref 3.5–5)
RBC # BLD: 2.62 M/UL — LOW (ref 4.7–6.1)
RBC # FLD: 12.5 % — SIGNIFICANT CHANGE UP (ref 11.5–14.5)
SODIUM SERPL-SCNC: 137 MMOL/L — SIGNIFICANT CHANGE UP (ref 135–146)
WBC # BLD: 2.96 K/UL — LOW (ref 4.8–10.8)
WBC # FLD AUTO: 2.96 K/UL — LOW (ref 4.8–10.8)

## 2020-08-14 PROCEDURE — 93306 TTE W/DOPPLER COMPLETE: CPT | Mod: 26

## 2020-08-14 NOTE — H&P CARDIOLOGY - HISTORY OF PRESENT ILLNESS
HPI  80 yr old male with PMH of HTN, DLD, moderate AS, hemochromatosis came here for elective RHC and LHC. Pt c/o significant dyspnea on mild exertion and had abnormal stress test as outpt.     REVIEW OF SYSTEMS:  CONSTITUTIONAL: No weakness, fevers or chills  EYES/ENT: No visual changes;  No vertigo or throat pain   NECK: No pain or stiffness  RESPIRATORY: No cough, wheezing, hemoptysis; SEE HPI  CARDIOVASCULAR: SEE HPI  GASTROINTESTINAL: No abdominal or epigastric pain. No nausea, vomiting, or hematemesis; No diarrhea or constipation. No melena or hematochezia.  GENITOURINARY: No dysuria, frequency or hematuria  NEUROLOGICAL: No numbness or weakness  SKIN: No itching, rashes      PHYSICAL EXAM:  T(C): --  HR: --  BP: --  RR: --  SpO2: --  GENERAL: NAD  HEAD:  Atraumatic, Normocephalic  EYES: conjunctiva and sclera clear  NECK: No JVD  CHEST/LUNG: Clear to auscultation bilaterally; No wheeze  HEART: Regular rate and rhythm; No murmurs  ABDOMEN: Soft, Nontender, Nondistended; Bowel sounds present  EXTREMITIES:  2+ Peripheral Pulses, No clubbing, cyanosis, or edema  NEUROLOGY:  A&Ox3, appropriate  SKIN: No rashes or lesions HPI  80 yr old male with PMH of HTN, DLD, CKD3, moderate AS, hemochromatosis came here for elective RHC and LHC. Pt c/o significant dyspnea on mild exertion and had abnormal stress test as outpt.     REVIEW OF SYSTEMS:  CONSTITUTIONAL: No weakness, fevers or chills  EYES/ENT: No visual changes;  No vertigo or throat pain   NECK: No pain or stiffness  RESPIRATORY: No cough, wheezing, hemoptysis; SEE HPI  CARDIOVASCULAR: SEE HPI  GASTROINTESTINAL: No abdominal or epigastric pain. No nausea, vomiting, or hematemesis; No diarrhea or constipation. No melena or hematochezia.  GENITOURINARY: No dysuria, frequency or hematuria  NEUROLOGICAL: No numbness or weakness  SKIN: No itching, rashes      PHYSICAL EXAM:  T(C): --  HR: --  BP: --  RR: --  SpO2: --  GENERAL: NAD  HEAD:  Atraumatic, Normocephalic  EYES: conjunctiva and sclera clear  NECK: No JVD  CHEST/LUNG: Clear to auscultation bilaterally; No wheeze  HEART: Regular rate and rhythm; No murmurs  ABDOMEN: Soft, Nontender, Nondistended; Bowel sounds present  EXTREMITIES:  2+ Peripheral Pulses, No clubbing, cyanosis, or edema  NEUROLOGY:  A&Ox3, appropriate  SKIN: No rashes or lesions

## 2020-08-14 NOTE — CONSULT NOTE ADULT - SUBJECTIVE AND OBJECTIVE BOX
Surgeon: /Lnidy/Rossana    Consult requesting by: Dr. Cason    HISTORY OF PRESENT ILLNESS:  80 yr old male with PMH of HTN, DLD, CKD3, moderate AS, hemochromatosis came here for elective RHC and LHC. Pt c/o significant dyspnea on mild exertion and had abnormal stress test as outpt.     NYHA functional class    [ ] Class I (no limitation) [ ] Class II (slight limitation) [ ] Class III (marked limitation) [ ] Class IV (symptoms at rest)    PAST MEDICAL & SURGICAL HISTORY:  Aortic stenosis  Hypertension  High cholesterol  Hemochromatosis  Angina at rest  Stented coronary artery  CAD (coronary artery disease)  Ankle injury  Total knee replacement status, bilateral      MEDICATIONS  (STANDING):    MEDICATIONS  (PRN):    Antiplatelet therapy:                           Last dose/amt:    Allergies    Drug Allergies Not Recorded  latex (Swelling)    Intolerances        SOCIAL HISTORY:  Smoker: [ ] Yes  [ ] No        PACK YEARS:                         WHEN QUIT?  ETOH use: [ ] Yes  [ ] No              FREQUENCY / QUANTITY:  Ilicit Drug use:  [ ] Yes  [ ] No  Occupation:  Lives with:  Assisted device use:  5 meter walk test: 1____sec, 2____sec, 3___sec  FAMILY HISTORY:      Review of Systems  CONSTITUTIONAL:  Fevers[ ] chills[ ] sweats[ ] fatigue[ ] weight loss[ ] weight gain [ ]                                     NEGATIVE [X ]   NEURO:  parathesias[ ] seizures [ ]  syncope [ ]  confusion [ ]                                                                                NEGATIVE[ X]   EYES: glasses[ ]  blurry vision[ ]  discharge[ ] pain[ ] glaucoma [ ]                                                                          NEGATIVE[X ]   ENMT:  difficulty hearing [ ]  vertigo[ ]  dysphagia[ ] epistaxis[ ] recent dental work [ ]                                    NEGATIVE[ X]   CV:  chest pain[ ] palpitations[ ] PAZ [ ] diaphoresis [ ]                                                                                           NEGATIVE[ X]   RESPIRATORY:  wheezing[ ] SOB[ ] cough [ ] sputum[ ] hemoptysis[ ]                                                                  NEGATIVE[ ]   GI:  nausea[ ]  vommiting [ ]  diarrhea[ ] constipation [ ] melena [ ]                                                                      NEGATIVE[ X]   : hematuria[ ]  dysuria[ ] urgency[ ] incontinence[ ]                                                                                            NEGATIVE[ X]   MUSKULOSKELETAL:  arthritis[ ]  joint swelling [ ] muscle weakness [ ] Hx vein stripping [ ]                             NEGATIVE[X ]   SKIN/BREAST:  rash[ ] itching [ ]  hair loss[ ] masses[ ]                                                                                              NEGATIVE[ X]   PSYCH:  dementia [ ] depresion [ ] anxiety[ ]                                                                                                               NEGATIVE[X ]   HEME/LYMPH:  bruises easily[ ] enlarged lymph nodes[ ] tender lymph nodes[ ]                                               NEGATIVE[ X]   ENDOCRINE:  cold intolerance[ ] heat intolerance[ ] polydipsia[ ]                                                                          NEGATIVE[ X]     PHYSICAL EXAM  Vital Signs Last 24 Hrs  T(C): --  T(F): --  HR: --  BP: --  BP(mean): --  RR: --  SpO2: --  Right arm bp: Left arm bp;    CONSTITUTIONAL:                                                                          WNL[ ]   Neuro: WNL[ ] Normal exam oriented to person/place & time with no focal motor or sensory  deficits. Other                     Eyes: WNL[ ]   Normal exam of conjunctiva & lids, pupils equally reactive. Other     ENT: WNL[ ]    Normal exam of nasal/oral mucosa with absence of cyanosis. Other  Neck: WNL[ ]  Normal exam of jugular veins, trachea & thyroid. Other  Chest: WNL[ ] Normal lung exam with good air movement absence of wheezes, rales, or rhonchi: Other                                                                                CV:  Auscultation: normal [ ] S3[ ] S4[ ] Irregular [ ] Rub[ ] Clicks[ ]    Murmurs none:[ ]systolic [ ]  diastolic [ ] holosystolic [ ]  Carotids: No Bruits[ ] Other                 Abdominal Aorta: normal [ ] nonpalpable[ ]Other                                                                                      GI:           WNL[ ] Normal exam of abdomen, liver & spleen with no noted masses or tenderness. Other                                                                                                        Extremities: WNL[ ] Normal no evidence of cyanosis or deformity Edema: none[ ]trace[ ]1+[ ]2+[ ]3+[ ]4+[ ]  Lower Extremity Pulses: Right[ ] Left[ ]Varicosities[ ]  SKIN :WNL[ ] Normal exam to inspection & palation. Other:                                                          LABS:                        13.5   2.96  )-----------( 281      ( 14 Aug 2020 07:19 )             36.8     08-14    137  |  104  |  31<H>  ----------------------------<  111<H>  4.6   |  21  |  1.6<H>    Ca    8.8      14 Aug 2020 07:19                  Cardiac Cath:    TTE / BRITNI:    Recommendation: (Procedures/Evaluations)  CT HEAD Nonn-Contrast:[  ]  CT Chest with /without contrast [ ]  Carotid Duplex :[ ]  BERNADETTE/PVR: [ ]  PFT : Simple PFT [ ]  Full [ ]  Renal Consult [ ]  Pulmonary Consult: [ ]   Vascular Consult [ ]    Dental Consult [ ]   Hem-Onc Consult [ ]   GI Consult [ ]   Other Consultations :    STS Score:     Impression:    CAD [ ]  Valvular  disease [ ]   Aortic Disease [ ]   HEMAL: Yes[ ] No [ ]   CKD Stage I [ ] , Stage II [ ] , Stage III [ ], Stage IV [ ]   Anemia: Yes [ ], No [ ]  Diabetes :Yes [ ], No [ ]  Acute MI: Yes [ ], No [ ]   Heart Failure: Yes [ ] , No [ ] HFpEF [ ], HFrEF [ ]        Assessment/ Plan:        f/u outpatient 8/21/2020 @ 2:30pm Surgeon: /Lindy/Rossana    Consult requesting by: Dr. Cason    HISTORY OF PRESENT ILLNESS:  80 yr old male with PMH of HTN, DLD, CKD3, moderate AS, hemochromatosis came here for elective RHC and LHC. Pt c/o significant dyspnea on mild exertion and had abnormal stress test as outpt.     NYHA functional class    [ ] Class I (no limitation) [ ] Class II (slight limitation) [ ] Class III (marked limitation) [ ] Class IV (symptoms at rest)    PAST MEDICAL & SURGICAL HISTORY:  Aortic stenosis  Hypertension  High cholesterol  Hemochromatosis  Angina at rest  Stented coronary artery  CAD (coronary artery disease)  Ankle injury  Total knee replacement status, bilateral    Home Medications:  Aspirin Low Dose 81 mg oral delayed release tablet: 1 tab(s) orally once a day (14 Aug 2020 07:27)  clopidogrel 75 mg oral tablet: 1 tab(s) orally once a day (14 Aug 2020 07:27)  Crestor 5 mg oral tablet: 1 tab(s) orally once a day (14 Aug 2020 07:27)  hydroxyurea 500 mg oral capsule: 500 milligram(s) orally once a day (14 Aug 2020 07:27)  lisinopril 10 mg oral tablet: 1 tab(s) orally once a day (14 Aug 2020 07:27)  Ranexa 1000 mg oral tablet, extended release: 1 tab(s) orally 2 times a day (14 Aug 2020 07:27)  Zetia 10 mg oral tablet: 1 tab(s) orally once a day (14 Aug 2020 07:27)  Antiplatelet therapy:                           Last dose/amt:    Allergies    Drug Allergies Not Recorded  latex (Swelling)    Intolerances      SOCIAL HISTORY:  Smoker: [ ] Yes  [ ] No        PACK YEARS:                         WHEN QUIT?  ETOH use: [ ] Yes  [ ] No              FREQUENCY / QUANTITY:  Ilicit Drug use:  [ ] Yes  [ ] No  Occupation:  Lives with:  Assisted device use:  5 meter walk test: 1____sec, 2____sec, 3___sec  FAMILY HISTORY:      Review of Systems  CONSTITUTIONAL:  Fevers[ ] chills[ ] sweats[ ] fatigue[ ] weight loss[ ] weight gain [ ]                                     NEGATIVE [X ]   NEURO:  parathesias[ ] seizures [ ]  syncope [ ]  confusion [ ]                                                                                NEGATIVE[ X]   EYES: glasses[ ]  blurry vision[ ]  discharge[ ] pain[ ] glaucoma [ ]                                                                          NEGATIVE[X ]   ENMT:  difficulty hearing [ ]  vertigo[ ]  dysphagia[ ] epistaxis[ ] recent dental work [ ]                                    NEGATIVE[ X]   CV:  chest pain[ ] palpitations[ ] PAZ [ ] diaphoresis [ ]                                                                                           NEGATIVE[ X]   RESPIRATORY:  wheezing[ ] SOB[ ] cough [ ] sputum[ ] hemoptysis[ ]                                                                  NEGATIVE[ ]   GI:  nausea[ ]  vommiting [ ]  diarrhea[ ] constipation [ ] melena [ ]                                                                      NEGATIVE[ X]   : hematuria[ ]  dysuria[ ] urgency[ ] incontinence[ ]                                                                                            NEGATIVE[ X]   MUSKULOSKELETAL:  arthritis[ ]  joint swelling [ ] muscle weakness [ ] Hx vein stripping [ ]                             NEGATIVE[X ]   SKIN/BREAST:  rash[ ] itching [ ]  hair loss[ ] masses[ ]                                                                                              NEGATIVE[ X]   PSYCH:  dementia [ ] depresion [ ] anxiety[ ]                                                                                                               NEGATIVE[X ]   HEME/LYMPH:  bruises easily[ ] enlarged lymph nodes[ ] tender lymph nodes[ ]                                               NEGATIVE[ X]   ENDOCRINE:  cold intolerance[ ] heat intolerance[ ] polydipsia[ ]                                                                          NEGATIVE[ X]     PHYSICAL EXAM=    CONSTITUTIONAL:                                                                          WNL[ ]   Neuro: WNL[ ] Normal exam oriented to person/place & time with no focal motor or sensory  deficits. Other                     Eyes: WNL[ ]   Normal exam of conjunctiva & lids, pupils equally reactive. Other     ENT: WNL[ ]    Normal exam of nasal/oral mucosa with absence of cyanosis. Other  Neck: WNL[ ]  Normal exam of jugular veins, trachea & thyroid. Other  Chest: WNL[ ] Normal lung exam with good air movement absence of wheezes, rales, or rhonchi: Other                                                                                CV:  Auscultation: normal [ ] S3[ ] S4[ ] Irregular [ ] Rub[ ] Clicks[ ]    Murmurs none:[ ]systolic [ ]  diastolic [ ] holosystolic [ ]  Carotids: No Bruits[ ] Other                 Abdominal Aorta: normal [ ] nonpalpable[ ]Other                                                                                      GI:           WNL[ ] Normal exam of abdomen, liver & spleen with no noted masses or tenderness. Other                                                                                                        Extremities: WNL[ ] Normal no evidence of cyanosis or deformity Edema: none[ ]trace[ ]1+[ ]2+[ ]3+[ ]4+[ ]  Lower Extremity Pulses: Right[ ] Left[ ]Varicosities[ ]  SKIN :WNL[ ] Normal exam to inspection & palation. Other:                                                          LABS:                        13.5   2.96  )-----------( 281      ( 14 Aug 2020 07:19 )             36.8     08-14    137  |  104  |  31<H>  ----------------------------<  111<H>  4.6   |  21  |  1.6<H>    Ca    8.8      14 Aug 2020 07:19          Cardiac Cath: nonobstructive CAD    TTE / BRITNI: EF 55%, trace MR, mild TR, moderate AS- TREV 1.6cm2, Peak gradient 33.8 mmHg, mean gradient 21.4 mmHg    Recommendation: (Procedures/Evaluations)  CT HEAD Nonn-Contrast:[  ]  CT Chest with /without contrast [ ]  Carotid Duplex :[ ]  BERNADETTE/PVR: [ ]  PFT : Simple PFT [ ]  Full [ ]  Renal Consult [ ]  Pulmonary Consult: [ ]   Vascular Consult [ ]    Dental Consult [ ]   Hem-Onc Consult [ ]   GI Consult [ ]   Other Consultations :    STS Score:     Impression:    CAD [ ]  Valvular  disease [x ] AORTIC STENOSIS  Aortic Disease [ ]   HEMAL: Yes[ ] No [ ]   CKD Stage I [ ] , Stage II [ ] , Stage III [ X], Stage IV [ ]   Anemia: Yes [ ], No [ ]  Diabetes :Yes [ ], No [ ]  Acute MI: Yes [ ], No [ ]   Heart Failure: Yes [ ] , No [ ] HFpEF [ ], HFrEF [ ]        Assessment/ Plan:        f/u outpatient 8/21/2020 @ 2:30pm Surgeon: /Lindy/Rossana    Consult requesting by: Dr. Cason    HISTORY OF PRESENT ILLNESS:  80 yr old male with PMH of HTN, DLD, CKD3, moderate AS, hemochromatosis came here for elective RHC and LHC. Pt c/o significant dyspnea on mild exertion and had abnormal stress test as outpt. Pt presented today for cardiac cath which revealed nonobstructive CAD. CT surgery consulted for moderate AS.     NYHA functional class    [ ] Class I (no limitation) [ ] Class II (slight limitation) [ ] Class III (marked limitation) [ ] Class IV (symptoms at rest)    PAST MEDICAL & SURGICAL HISTORY:  Aortic stenosis  Hypertension  High cholesterol  Hemochromatosis  Angina at rest  Stented coronary artery  CAD (coronary artery disease)  Ankle injury  Total knee replacement status, bilateral    Home Medications:  Aspirin Low Dose 81 mg oral delayed release tablet: 1 tab(s) orally once a day (14 Aug 2020 07:27)  clopidogrel 75 mg oral tablet: 1 tab(s) orally once a day (14 Aug 2020 07:27)  Crestor 5 mg oral tablet: 1 tab(s) orally once a day (14 Aug 2020 07:27)  hydroxyurea 500 mg oral capsule: 500 milligram(s) orally once a day (14 Aug 2020 07:27)  lisinopril 10 mg oral tablet: 1 tab(s) orally once a day (14 Aug 2020 07:27)  Ranexa 1000 mg oral tablet, extended release: 1 tab(s) orally 2 times a day (14 Aug 2020 07:27)  Zetia 10 mg oral tablet: 1 tab(s) orally once a day (14 Aug 2020 07:27)  Antiplatelet therapy:                           Last dose/amt:    Allergies    Drug Allergies Not Recorded  latex (Swelling)    Intolerances      SOCIAL HISTORY:  Smoker: [ ] Yes  [ x] No        PACK YEARS:                         WHEN QUIT?  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  5 meter walk test: 1____sec, 2____sec, 3___sec- unable to do just cath  FAMILY HISTORY: No significant family hx      Review of Systems  CONSTITUTIONAL:  Fevers[ ] chills[ ] sweats[ ] fatigue[ ] weight loss[ ] weight gain [ ]                                     NEGATIVE [X ]   NEURO:  parathesias[ ] seizures [ ]  syncope [ ]  confusion [ ]                                                                                NEGATIVE[ X]   EYES: glasses[ ]  blurry vision[ ]  discharge[ ] pain[ ] glaucoma [ ]                                                                          NEGATIVE[X ]   ENMT:  difficulty hearing [ ]  vertigo[ ]  dysphagia[ ] epistaxis[ ] recent dental work [ ]                                    NEGATIVE[ X]   CV:  chest pain[ ] palpitations[ ] PAZ [ ] diaphoresis [ ]                                                                                           NEGATIVE[ X]   RESPIRATORY:  wheezing[ ] SOB[ ] cough [ ] sputum[ ] hemoptysis[ ]                                                                  NEGATIVE[ ]   GI:  nausea[ ]  vommiting [ ]  diarrhea[ ] constipation [ ] melena [ ]                                                                      NEGATIVE[ X]   : hematuria[ ]  dysuria[ ] urgency[ ] incontinence[ ]                                                                                            NEGATIVE[ X]   MUSKULOSKELETAL:  arthritis[ ]  joint swelling [ ] muscle weakness [ ] Hx vein stripping [ ]                             NEGATIVE[X ]   SKIN/BREAST:  rash[ ] itching [ ]  hair loss[ ] masses[ ]                                                                                              NEGATIVE[ X]   PSYCH:  dementia [ ] depresion [ ] anxiety[ ]                                                                                                               NEGATIVE[X ]   HEME/LYMPH:  bruises easily[ ] enlarged lymph nodes[ ] tender lymph nodes[ ]                                               NEGATIVE[ X]   ENDOCRINE:  cold intolerance[ ] heat intolerance[ ] polydipsia[ ]                                                                          NEGATIVE[ X]     PHYSICAL EXAM    CONSTITUTIONAL:                                                                          WNL[ x]   Neuro: WNL[x ] Normal exam oriented to person/place & time with no focal motor or sensory  deficits. Other                     Eyes: WNL[x ]   Normal exam of conjunctiva & lids, pupils equally reactive. Other     ENT: WNL[x ]    Normal exam of nasal/oral mucosa with absence of cyanosis. Other  Neck: WNL[x ]  Normal exam of jugular veins, trachea & thyroid. Other  Chest: WNL[x ] Normal lung exam with good air movement absence of wheezes, rales, or rhonchi: Other                                                                                CV:  Auscultation: normal [x ] S3[ ] S4[ ] Irregular [ ] Rub[ ] Clicks[ ]    Murmurs none:[ ]systolic [x ]  diastolic [ ] holosystolic [ ]  Carotids: No Bruits[ ] Other                 Abdominal Aorta: normal [ ] nonpalpable[ ]Other                                                                                      GI:           WNL[x ] Normal exam of abdomen, liver & spleen with no noted masses or tenderness. Other                                                                                                        Extremities: WNL[ x] Normal no evidence of cyanosis or deformity Edema: none[ ]trace[ ]1+[ ]2+[ ]3+[ ]4+[ ]  Lower Extremity Pulses: Right[ ] Left[ ]Varicosities[ ]  SKIN :WNL[x ] Normal exam to inspection & palation. Other:                                                          LABS:                        13.5   2.96  )-----------( 281      ( 14 Aug 2020 07:19 )             36.8     08-14    137  |  104  |  31<H>  ----------------------------<  111<H>  4.6   |  21  |  1.6<H>    Ca    8.8      14 Aug 2020 07:19          Cardiac Cath: nonobstructive CAD    TTE / BRITNI: EF 55%, trace MR, mild TR, moderate AS- TREV 1.6cm2, Peak gradient 33.8 mmHg, mean gradient 21.4 mmHg    Recommendation: (Procedures/Evaluations)  CT HEAD Nonn-Contrast:[  ]  CT Chest with /without contrast [ ]  Carotid Duplex :[ ]  BERNADETTE/PVR: [ ]  PFT : Simple PFT [ ]  Full [ ]  Renal Consult [ ]  Pulmonary Consult: [ ]   Vascular Consult [ ]    Dental Consult [ ]   Hem-Onc Consult [ ]   GI Consult [ ]   Other Consultations :    STS Score: Risk of Mortality:  2.517%  Renal Failure:  2.448%  Permanent Stroke:  2.152%  Prolonged Ventilation:  5.729%  DSW Infection:  0.106%  Reoperation:  5.032%  Morbidity or Mortality:  11.409%  Short Length of Stay:  38.742%  Long Length of Stay:  4.891%    Impression:    CAD [ ]  Valvular  disease [x ] AORTIC STENOSIS  Aortic Disease [ ]   HEMAL: Yes[ ] No [ ]   CKD Stage I [ ] , Stage II [ ] , Stage III [ X], Stage IV [ ]   Anemia: Yes [ ], No [ ]  Diabetes :Yes [ ], No [ ]  Acute MI: Yes [ ], No [ ]   Heart Failure: Yes [ ] , No [ ] HFpEF [ ], HFrEF [ ]        Assessment/ Plan:  80 yr old male with PMH of HTN, DLD, CKD3, moderate AS, hemochromatosis came here for elective RHC and LHC. Pt c/o significant dyspnea on mild exertion and had abnormal stress test as outpt. Pt presented today for cardiac cath which revealed nonobstructive CAD. CT surgery consulted for moderate AS.   TAVR vs SAVR  [facility]  f/u outpatient 8/21/2020 @ 2:30pm  pt may be discharged home  continue home medications

## 2020-08-14 NOTE — ASU PATIENT PROFILE, ADULT - PMH
Angina at rest    Aortic stenosis    CAD (coronary artery disease)    Hemochromatosis    High cholesterol    Hypertension    Stented coronary artery

## 2020-08-14 NOTE — CHART NOTE - NSCHARTNOTEFT_GEN_A_CORE
PRE-OP DIAGNOSIS:   Suspected CAD  Moderate to severe AS    PROCEDURE: [facility] with coronary angiography    Attending Physician: Dr. Cason  Fellow: Dr. Bryson  Fellow: Dr. Villarreal    ANESTHESIA TYPE:  [] General Anesthesia  [x] Sedation  [x] Local/Regional    ESTIMATED BLOOD LOSS:    25   mL    CONDITION  [] Critical  [] Serious  [] Fair  [x] Good    IV CONTRAST:    80  mL ([place])    FINDINGS  Left Heart Catheterization:  LVEF%: 60-65%  LVEDP: Normal  [] Normal Coronary Arteries  [] Luminal Irregularities  [X] Non-obstructive CAD  [] Significant CAD    ACCESS:  [x] right radial artery  [] right femoral artery    LEFT HEART CATHETERIZATION  Left main: Mild disease  LAD: Mild disease, Patent stent to mid LAD  Diag: Moderate disease ostial  Left Circumflex: Mild disease  OM: Moderate disease  Right Coronary Artery: Moderate disease    Valvular study:  Significant aortic valve disease (severe AS)    Right heart cath:  - PCWP: 15  - PA: 32/12/19  - CO: 3.5 L/min    INTERVENTION  IMPLANTS: none    POST-OP DIAGNOSIS:  Non-obstructive CAD    PLAN OF CARE:  [x] D/C Home today  [] D/C in AM  [] Return to In-patient bed  [] Admit for observation  [] Return for staged procedure:  [] CT Surgery consult called  [x] Structural heart team called for consult  [x] Continue same outpatient medical therapy

## 2020-08-19 DIAGNOSIS — I25.118 ATHEROSCLEROTIC HEART DISEASE OF NATIVE CORONARY ARTERY WITH OTHER FORMS OF ANGINA PECTORIS: ICD-10-CM

## 2020-08-19 DIAGNOSIS — E78.00 PURE HYPERCHOLESTEROLEMIA, UNSPECIFIED: ICD-10-CM

## 2020-08-19 DIAGNOSIS — I10 ESSENTIAL (PRIMARY) HYPERTENSION: ICD-10-CM

## 2020-08-19 DIAGNOSIS — Z91.040 LATEX ALLERGY STATUS: ICD-10-CM

## 2020-08-19 DIAGNOSIS — N19 UNSPECIFIED KIDNEY FAILURE: ICD-10-CM

## 2020-08-19 DIAGNOSIS — Z79.82 LONG TERM (CURRENT) USE OF ASPIRIN: ICD-10-CM

## 2020-08-19 DIAGNOSIS — Z79.02 LONG TERM (CURRENT) USE OF ANTITHROMBOTICS/ANTIPLATELETS: ICD-10-CM

## 2020-08-19 DIAGNOSIS — I20.8 OTHER FORMS OF ANGINA PECTORIS: ICD-10-CM

## 2020-08-21 ENCOUNTER — APPOINTMENT (OUTPATIENT)
Dept: CARDIOTHORACIC SURGERY | Facility: CLINIC | Age: 80
End: 2020-08-21
Payer: COMMERCIAL

## 2020-08-21 ENCOUNTER — APPOINTMENT (OUTPATIENT)
Dept: CARDIOLOGY | Facility: CLINIC | Age: 80
End: 2020-08-21
Payer: COMMERCIAL

## 2020-08-21 VITALS
RESPIRATION RATE: 12 BRPM | WEIGHT: 200 LBS | HEART RATE: 89 BPM | BODY MASS INDEX: 26.51 KG/M2 | SYSTOLIC BLOOD PRESSURE: 111 MMHG | OXYGEN SATURATION: 64 % | TEMPERATURE: 98 F | HEIGHT: 73 IN | DIASTOLIC BLOOD PRESSURE: 89 MMHG

## 2020-08-21 VITALS
HEIGHT: 73 IN | RESPIRATION RATE: 14 BRPM | BODY MASS INDEX: 26.51 KG/M2 | TEMPERATURE: 98 F | DIASTOLIC BLOOD PRESSURE: 89 MMHG | WEIGHT: 200 LBS | SYSTOLIC BLOOD PRESSURE: 111 MMHG | OXYGEN SATURATION: 63 % | HEART RATE: 89 BPM

## 2020-08-21 PROCEDURE — 99203 OFFICE O/P NEW LOW 30 MIN: CPT

## 2020-08-21 PROCEDURE — 99204 OFFICE O/P NEW MOD 45 MIN: CPT

## 2020-08-21 NOTE — PHYSICAL EXAM
[PERRL With Normal Accommodation] : pupils were equal in size, round, and reactive to light [Sclera] : the sclera and conjunctiva were normal [Extraocular Movements] : extraocular movements were intact [Auscultation Breath Sounds / Voice Sounds] : lungs were clear to auscultation bilaterally [Normal Rate] : normal [Bowel Sounds] : normal bowel sounds [Abdomen Tenderness] : non-tender [Abdomen Soft] : soft [Skin Color & Pigmentation] : normal skin color and pigmentation [Abdomen Mass (___ Cm)] : no abdominal mass palpated [Skin Turgor] : normal skin turgor [Deep Tendon Reflexes (DTR)] : deep tendon reflexes were 2+ and symmetric [Sensation] : the sensory exam was normal to light touch and pinprick [] : no rash [No Focal Deficits] : no focal deficits [Oriented To Time, Place, And Person] : oriented to person, place, and time [Impaired Insight] : insight and judgment were intact [Affect] : the affect was normal [IV] : a grade 4

## 2020-08-21 NOTE — HISTORY OF PRESENT ILLNESS
[Dyslipidemia] : Dyslipidemia [Hypertension] : Hypertension [FreeTextEntry1] : Mr. MARIA LUISA COATS 80 year M arrives  today for evaluation of their aortic stenosis. Patient PMH include  HTN, DLD, CKD3, moderate AS, hemochromatosis. Symptoms include shortness of breath on exertion. NYHA class II. He states that he is extremely short of breath on exertion. He is unable to walk a flight without having to take a break. He also notices fatigue. \par \par \par They're healthcare team includes the following\par PMD: \par Cardio: [facility]\par Pulmonary:\par

## 2020-08-21 NOTE — PHYSICAL EXAM
[General Appearance - Well Developed] : well developed [No Deformities] : no deformities [Normal Appearance] : normal appearance [General Appearance - Well Nourished] : well nourished [Well Groomed] : well groomed [General Appearance - In No Acute Distress] : no acute distress [Normal Conjunctiva] : the conjunctiva exhibited no abnormalities [Eyelids - No Xanthelasma] : the eyelids demonstrated no xanthelasmas [IV] : a grade 4 [Normal Rate] : normal [Respiration, Rhythm And Depth] : normal respiratory rhythm and effort [Exaggerated Use Of Accessory Muscles For Inspiration] : no accessory muscle use [Nail Clubbing] : no clubbing of the fingernails [Cyanosis, Localized] : no localized cyanosis [Auscultation Breath Sounds / Voice Sounds] : lungs were clear to auscultation bilaterally [Petechial Hemorrhages (___cm)] : no petechial hemorrhages [Skin Color & Pigmentation] : normal skin color and pigmentation [No Venous Stasis] : no venous stasis [Skin Lesions] : no skin lesions [No Skin Ulcers] : no skin ulcer [] : no rash [No Xanthoma] : no  xanthoma was observed [Oriented To Time, Place, And Person] : oriented to person, place, and time [Mood] : the mood was normal [No Anxiety] : not feeling anxious [Affect] : the affect was normal

## 2020-08-21 NOTE — REVIEW OF SYSTEMS
[Shortness Of Breath] : shortness of breath [Feeling Fatigued] : feeling fatigued [Lower Ext Edema] : lower extremity edema [Dyspnea on exertion] : dyspnea during exertion [Negative] : Heme/Lymph

## 2020-08-21 NOTE — ASSESSMENT
[FreeTextEntry1] : Mr. MARIA LUISA COATS 80 year M arrives  today for evaluation of their aortic stenosis.  Patient PMH include  HTN, DLD, CKD3, moderate AS, hemochromatosis Symptoms include shortness of breath on exertion. NYHA class II. All questions and concerns were addressed with the patient. Pre-op planning was discussed with the patient. Plan for structural CTA , carotids, PFT. \par \par \par \par Has symptomatic AS. TAVR best option. explained risks of surgery to patient. He is agreeable. will schedule for CTA.

## 2020-08-26 PROBLEM — I10 ESSENTIAL (PRIMARY) HYPERTENSION: Chronic | Status: ACTIVE | Noted: 2020-08-14

## 2020-08-26 PROBLEM — I25.10 ATHEROSCLEROTIC HEART DISEASE OF NATIVE CORONARY ARTERY WITHOUT ANGINA PECTORIS: Chronic | Status: ACTIVE | Noted: 2020-08-14

## 2020-08-26 PROBLEM — E83.119 HEMOCHROMATOSIS, UNSPECIFIED: Chronic | Status: ACTIVE | Noted: 2020-08-14

## 2020-08-26 PROBLEM — I20.8 OTHER FORMS OF ANGINA PECTORIS: Chronic | Status: ACTIVE | Noted: 2020-08-14

## 2020-08-26 PROBLEM — E78.00 PURE HYPERCHOLESTEROLEMIA, UNSPECIFIED: Chronic | Status: ACTIVE | Noted: 2020-08-14

## 2020-08-26 PROBLEM — Z95.5 PRESENCE OF CORONARY ANGIOPLASTY IMPLANT AND GRAFT: Chronic | Status: ACTIVE | Noted: 2020-08-14

## 2020-08-26 PROBLEM — I35.0 NONRHEUMATIC AORTIC (VALVE) STENOSIS: Chronic | Status: ACTIVE | Noted: 2020-08-14

## 2020-08-28 ENCOUNTER — RESULT REVIEW (OUTPATIENT)
Age: 80
End: 2020-08-28

## 2020-08-28 ENCOUNTER — OUTPATIENT (OUTPATIENT)
Dept: OUTPATIENT SERVICES | Facility: HOSPITAL | Age: 80
LOS: 1 days | Discharge: HOME | End: 2020-08-28
Payer: COMMERCIAL

## 2020-08-28 DIAGNOSIS — Z96.653 PRESENCE OF ARTIFICIAL KNEE JOINT, BILATERAL: Chronic | ICD-10-CM

## 2020-08-28 DIAGNOSIS — S99.919A UNSPECIFIED INJURY OF UNSPECIFIED ANKLE, INITIAL ENCOUNTER: Chronic | ICD-10-CM

## 2020-08-28 PROCEDURE — 74174 CTA ABD&PLVS W/CONTRAST: CPT | Mod: 26

## 2020-08-28 PROCEDURE — 93880 EXTRACRANIAL BILAT STUDY: CPT | Mod: 26

## 2020-08-28 PROCEDURE — 75574 CT ANGIO HRT W/3D IMAGE: CPT | Mod: 26

## 2020-08-31 ENCOUNTER — OUTPATIENT (OUTPATIENT)
Dept: OUTPATIENT SERVICES | Facility: HOSPITAL | Age: 80
LOS: 1 days | Discharge: HOME | End: 2020-08-31

## 2020-08-31 ENCOUNTER — LABORATORY RESULT (OUTPATIENT)
Age: 80
End: 2020-08-31

## 2020-08-31 ENCOUNTER — APPOINTMENT (OUTPATIENT)
Dept: CARDIOLOGY | Facility: CLINIC | Age: 80
End: 2020-08-31
Payer: COMMERCIAL

## 2020-08-31 ENCOUNTER — APPOINTMENT (OUTPATIENT)
Dept: CARDIOTHORACIC SURGERY | Facility: CLINIC | Age: 80
End: 2020-08-31
Payer: MEDICARE

## 2020-08-31 VITALS
OXYGEN SATURATION: 98 % | HEIGHT: 73 IN | RESPIRATION RATE: 12 BRPM | BODY MASS INDEX: 26.51 KG/M2 | SYSTOLIC BLOOD PRESSURE: 163 MMHG | DIASTOLIC BLOOD PRESSURE: 77 MMHG | WEIGHT: 200 LBS | HEART RATE: 67 BPM | TEMPERATURE: 98 F

## 2020-08-31 VITALS — DIASTOLIC BLOOD PRESSURE: 84 MMHG | SYSTOLIC BLOOD PRESSURE: 164 MMHG

## 2020-08-31 DIAGNOSIS — Z01.818 ENCOUNTER FOR OTHER PREPROCEDURAL EXAMINATION: ICD-10-CM

## 2020-08-31 DIAGNOSIS — S99.919A UNSPECIFIED INJURY OF UNSPECIFIED ANKLE, INITIAL ENCOUNTER: Chronic | ICD-10-CM

## 2020-08-31 DIAGNOSIS — Z11.59 ENCOUNTER FOR SCREENING FOR OTHER VIRAL DISEASES: ICD-10-CM

## 2020-08-31 DIAGNOSIS — Z96.653 PRESENCE OF ARTIFICIAL KNEE JOINT, BILATERAL: Chronic | ICD-10-CM

## 2020-08-31 PROCEDURE — 99024 POSTOP FOLLOW-UP VISIT: CPT

## 2020-08-31 PROCEDURE — 93000 ELECTROCARDIOGRAM COMPLETE: CPT

## 2020-08-31 RX ORDER — CLOBETASOL PROPIONATE 0.5 MG/G
0.05 CREAM TOPICAL TWICE DAILY
Qty: 1 | Refills: 1 | Status: DISCONTINUED | COMMUNITY
Start: 2019-06-11 | End: 2020-08-31

## 2020-08-31 NOTE — ASSESSMENT
[FreeTextEntry1] : Mr. MARIA LUISA COATS 80 year M arrives  today for preoperative evaluation for TAVR for aortic stenosis. Patient PMH include  HTN, DLD, CKD3, moderate AS, hemochromatosis. Symptoms include shortness of breath on exertion. NYHA class II. He states that he is extremely short of breath on exertion. He is unable to walk a flight without having to take a break. He also notices fatigue. He is doing well. Preoperative examination normal. Plan for labs today, EKG with interpretation by Dr. Ontiveros. Medication reviewed.  Hold lisinopril prior to TAVR. \par \par \par

## 2020-08-31 NOTE — HISTORY OF PRESENT ILLNESS
[Dyslipidemia] : Dyslipidemia [Hypertension] : Hypertension [FreeTextEntry1] : Mr. MARIA LUISA COATS 80 year M arrives  today for preoperative evaluation for TAVR for aortic stenosis. Patient PMH include  HTN, DLD, CKD3, moderate AS, hemochromatosis. Symptoms include shortness of breath on exertion. NYHA class II. He states that he is extremely short of breath on exertion. He is unable to walk a flight without having to take a break. He also notices fatigue. \par \par \par They're healthcare team includes the following\par Cardio: Kamla\par \par

## 2020-08-31 NOTE — PHYSICAL EXAM
[Sclera] : the sclera and conjunctiva were normal [Extraocular Movements] : extraocular movements were intact [PERRL With Normal Accommodation] : pupils were equal in size, round, and reactive to light [Auscultation Breath Sounds / Voice Sounds] : lungs were clear to auscultation bilaterally [Bowel Sounds] : normal bowel sounds [Abdomen Soft] : soft [Abdomen Tenderness] : non-tender [Abdomen Mass (___ Cm)] : no abdominal mass palpated [Skin Color & Pigmentation] : normal skin color and pigmentation [Skin Turgor] : normal skin turgor [] : no rash [Deep Tendon Reflexes (DTR)] : deep tendon reflexes were 2+ and symmetric [Sensation] : the sensory exam was normal to light touch and pinprick [No Focal Deficits] : no focal deficits [Impaired Insight] : insight and judgment were intact [Oriented To Time, Place, And Person] : oriented to person, place, and time [Affect] : the affect was normal [Normal Rate] : normal [IV] : a grade 4 [Neck Appearance] : the appearance of the neck was normal [Neck Cervical Mass (___cm)] : no neck mass was observed [Thyroid Diffuse Enlargement] : the thyroid was not enlarged [Jugular Venous Distention Increased] : there was no jugular-venous distention [Thyroid Nodule] : there were no palpable thyroid nodules [Normal] : normal [Rhythm Regular] : regular [Normal S1] : normal S1 [Normal S2] : normal S2 [2+] : left 2+ [1+] : left 1+

## 2020-09-01 LAB — SARS-COV-2 N GENE NPH QL NAA+PROBE: NOT DETECTED

## 2020-09-01 NOTE — ASSESSMENT
[FreeTextEntry1] : Mr. MARIA LUISA COATS 80 year M arrives today for evaluation of their aortic stenosis. Patient PMH include HTN, DLD, CKD3, moderate AS, hemochromatosis Symptoms include shortness of breath on exertion. NYHA class II. All questions and concerns were addressed with the patient. Pre-op planning was discussed with the patient. Plan for structural CTA , carotids, PFT.

## 2020-09-01 NOTE — REASON FOR VISIT
[Aortic Stenosis] : aortic stenosis [Consultation] : a consultation regarding [FreeTextEntry1] : Mr. MARIA LUISA COATS 80 year M arrives today for evaluation of their aortic stenosis. Patient PMH include HTN, DLD, CKD3, moderate AS, hemochromatosis. Symptoms include shortness of breath on exertion. NYHA class II. He states that he is extremely short of breath on exertion. He is unable to walk a flight without having to take a break. He also notices fatigue.

## 2020-09-01 NOTE — END OF VISIT
[FreeTextEntry3] : Seen / examined and above reviewed.\par \par Severe symptomatic aortic stenosis.\par CAD s/p PCI\par Comorbidities as above.\par \par ECHO and Cath tracings reviewed.\par ECHO: nL LVSF.  PV = 4.0 m/s, MG = 36 mmHg, TREV = 0.92 cm2.\par CATH: MG = 37 mmHg.  TREV 0.74 cm2.\par \par Plan for CTA followed by expeditious TAVR.

## 2020-09-02 ENCOUNTER — APPOINTMENT (OUTPATIENT)
Dept: CARDIOTHORACIC SURGERY | Facility: CLINIC | Age: 80
End: 2020-09-02

## 2020-09-02 ENCOUNTER — APPOINTMENT (OUTPATIENT)
Dept: CARDIOTHORACIC SURGERY | Facility: HOSPITAL | Age: 80
End: 2020-09-02

## 2020-09-02 ENCOUNTER — TRANSCRIPTION ENCOUNTER (OUTPATIENT)
Age: 80
End: 2020-09-02

## 2020-09-02 ENCOUNTER — INPATIENT (INPATIENT)
Facility: HOSPITAL | Age: 80
LOS: 0 days | Discharge: HOME | End: 2020-09-03
Attending: THORACIC SURGERY (CARDIOTHORACIC VASCULAR SURGERY) | Admitting: THORACIC SURGERY (CARDIOTHORACIC VASCULAR SURGERY)
Payer: COMMERCIAL

## 2020-09-02 VITALS
TEMPERATURE: 98 F | SYSTOLIC BLOOD PRESSURE: 127 MMHG | WEIGHT: 202.83 LBS | OXYGEN SATURATION: 100 % | DIASTOLIC BLOOD PRESSURE: 60 MMHG | RESPIRATION RATE: 16 BRPM | HEART RATE: 60 BPM | HEIGHT: 74 IN

## 2020-09-02 DIAGNOSIS — Z96.653 PRESENCE OF ARTIFICIAL KNEE JOINT, BILATERAL: Chronic | ICD-10-CM

## 2020-09-02 DIAGNOSIS — N18.3 CHRONIC KIDNEY DISEASE, STAGE 3 (MODERATE): ICD-10-CM

## 2020-09-02 DIAGNOSIS — S99.919A UNSPECIFIED INJURY OF UNSPECIFIED ANKLE, INITIAL ENCOUNTER: Chronic | ICD-10-CM

## 2020-09-02 LAB
ALBUMIN SERPL ELPH-MCNC: 3.5 G/DL — SIGNIFICANT CHANGE UP (ref 3.5–5.2)
ALP SERPL-CCNC: 53 U/L — SIGNIFICANT CHANGE UP (ref 30–115)
ALT FLD-CCNC: 11 U/L — SIGNIFICANT CHANGE UP (ref 0–41)
ANION GAP SERPL CALC-SCNC: 8 MMOL/L — SIGNIFICANT CHANGE UP (ref 7–14)
APTT BLD: 29.9 SEC — SIGNIFICANT CHANGE UP (ref 27–39.2)
AST SERPL-CCNC: 25 U/L — SIGNIFICANT CHANGE UP (ref 0–41)
BASOPHILS # BLD AUTO: 0.01 K/UL — SIGNIFICANT CHANGE UP (ref 0–0.2)
BASOPHILS NFR BLD AUTO: 0.3 % — SIGNIFICANT CHANGE UP (ref 0–1)
BILIRUB SERPL-MCNC: 0.5 MG/DL — SIGNIFICANT CHANGE UP (ref 0.2–1.2)
BUN SERPL-MCNC: 26 MG/DL — HIGH (ref 10–20)
CALCIUM SERPL-MCNC: 7.6 MG/DL — LOW (ref 8.5–10.1)
CHLORIDE SERPL-SCNC: 105 MMOL/L — SIGNIFICANT CHANGE UP (ref 98–110)
CO2 SERPL-SCNC: 22 MMOL/L — SIGNIFICANT CHANGE UP (ref 17–32)
CREAT SERPL-MCNC: 1.5 MG/DL — SIGNIFICANT CHANGE UP (ref 0.7–1.5)
EOSINOPHIL # BLD AUTO: 0 K/UL — SIGNIFICANT CHANGE UP (ref 0–0.7)
EOSINOPHIL NFR BLD AUTO: 0 % — SIGNIFICANT CHANGE UP (ref 0–8)
GAS PNL BLDA: SIGNIFICANT CHANGE UP
GLUCOSE BLDC GLUCOMTR-MCNC: 103 MG/DL — HIGH (ref 70–99)
GLUCOSE BLDC GLUCOMTR-MCNC: 157 MG/DL — HIGH (ref 70–99)
GLUCOSE BLDC GLUCOMTR-MCNC: 172 MG/DL — HIGH (ref 70–99)
GLUCOSE BLDC GLUCOMTR-MCNC: 64 MG/DL — LOW (ref 70–99)
GLUCOSE BLDC GLUCOMTR-MCNC: 82 MG/DL — SIGNIFICANT CHANGE UP (ref 70–99)
GLUCOSE BLDC GLUCOMTR-MCNC: 92 MG/DL — SIGNIFICANT CHANGE UP (ref 70–99)
GLUCOSE SERPL-MCNC: 105 MG/DL — HIGH (ref 70–99)
HCT VFR BLD CALC: 29.7 % — LOW (ref 42–52)
HGB BLD-MCNC: 10.7 G/DL — LOW (ref 14–18)
IMM GRANULOCYTES NFR BLD AUTO: 0.8 % — HIGH (ref 0.1–0.3)
INR BLD: 1.04 RATIO — SIGNIFICANT CHANGE UP (ref 0.65–1.3)
LYMPHOCYTES # BLD AUTO: 0.46 K/UL — LOW (ref 1.2–3.4)
LYMPHOCYTES # BLD AUTO: 12.9 % — LOW (ref 20.5–51.1)
MAGNESIUM SERPL-MCNC: 2.2 MG/DL — SIGNIFICANT CHANGE UP (ref 1.8–2.4)
MCHC RBC-ENTMCNC: 36 G/DL — SIGNIFICANT CHANGE UP (ref 32–37)
MCHC RBC-ENTMCNC: 51.4 PG — HIGH (ref 27–31)
MCV RBC AUTO: 142.8 FL — HIGH (ref 80–94)
MONOCYTES # BLD AUTO: 0.15 K/UL — SIGNIFICANT CHANGE UP (ref 0.1–0.6)
MONOCYTES NFR BLD AUTO: 4.2 % — SIGNIFICANT CHANGE UP (ref 1.7–9.3)
NEUTROPHILS # BLD AUTO: 2.91 K/UL — SIGNIFICANT CHANGE UP (ref 1.4–6.5)
NEUTROPHILS NFR BLD AUTO: 81.8 % — HIGH (ref 42.2–75.2)
NRBC # BLD: 0 /100 WBCS — SIGNIFICANT CHANGE UP (ref 0–0)
PLATELET # BLD AUTO: 114 K/UL — LOW (ref 130–400)
POTASSIUM SERPL-MCNC: 5.2 MMOL/L — HIGH (ref 3.5–5)
POTASSIUM SERPL-SCNC: 5.2 MMOL/L — HIGH (ref 3.5–5)
PROT SERPL-MCNC: 5.8 G/DL — LOW (ref 6–8)
PROTHROM AB SERPL-ACNC: 12 SEC — SIGNIFICANT CHANGE UP (ref 9.95–12.87)
RBC # BLD: 2.08 M/UL — LOW (ref 4.7–6.1)
RBC # FLD: 12.2 % — SIGNIFICANT CHANGE UP (ref 11.5–14.5)
SODIUM SERPL-SCNC: 135 MMOL/L — SIGNIFICANT CHANGE UP (ref 135–146)
WBC # BLD: 3.56 K/UL — LOW (ref 4.8–10.8)
WBC # FLD AUTO: 3.56 K/UL — LOW (ref 4.8–10.8)

## 2020-09-02 PROCEDURE — 33361 REPLACE AORTIC VALVE PERQ: CPT | Mod: 62,Q0

## 2020-09-02 PROCEDURE — 33361 REPLACE AORTIC VALVE PERQ: CPT | Mod: 62

## 2020-09-02 PROCEDURE — 99251: CPT | Mod: 25,57

## 2020-09-02 PROCEDURE — 71045 X-RAY EXAM CHEST 1 VIEW: CPT | Mod: 26,76

## 2020-09-02 PROCEDURE — 33208 INSRT HEART PM ATRIAL & VENT: CPT | Mod: KX

## 2020-09-02 PROCEDURE — 93306 TTE W/DOPPLER COMPLETE: CPT | Mod: 26

## 2020-09-02 RX ORDER — RANOLAZINE 500 MG/1
1000 TABLET, FILM COATED, EXTENDED RELEASE ORAL
Refills: 0 | Status: DISCONTINUED | OUTPATIENT
Start: 2020-09-02 | End: 2020-09-03

## 2020-09-02 RX ORDER — EZETIMIBE 10 MG/1
1 TABLET ORAL
Qty: 0 | Refills: 0 | DISCHARGE

## 2020-09-02 RX ORDER — HYDROXYUREA 500 MG/1
500 CAPSULE ORAL DAILY
Refills: 0 | Status: DISCONTINUED | OUTPATIENT
Start: 2020-09-03 | End: 2020-09-03

## 2020-09-02 RX ORDER — FENTANYL CITRATE 50 UG/ML
25 INJECTION INTRAVENOUS ONCE
Refills: 0 | Status: DISCONTINUED | OUTPATIENT
Start: 2020-09-02 | End: 2020-09-03

## 2020-09-02 RX ORDER — ASPIRIN/CALCIUM CARB/MAGNESIUM 324 MG
81 TABLET ORAL DAILY
Refills: 0 | Status: DISCONTINUED | OUTPATIENT
Start: 2020-09-03 | End: 2020-09-03

## 2020-09-02 RX ORDER — IPRATROPIUM/ALBUTEROL SULFATE 18-103MCG
3 AEROSOL WITH ADAPTER (GRAM) INHALATION EVERY 6 HOURS
Refills: 0 | Status: DISCONTINUED | OUTPATIENT
Start: 2020-09-02 | End: 2020-09-03

## 2020-09-02 RX ORDER — SODIUM CHLORIDE 9 MG/ML
1000 INJECTION INTRAMUSCULAR; INTRAVENOUS; SUBCUTANEOUS
Refills: 0 | Status: DISCONTINUED | OUTPATIENT
Start: 2020-09-02 | End: 2020-09-03

## 2020-09-02 RX ORDER — ASPIRIN/CALCIUM CARB/MAGNESIUM 324 MG
81 TABLET ORAL DAILY
Refills: 0 | Status: DISCONTINUED | OUTPATIENT
Start: 2020-09-02 | End: 2020-09-02

## 2020-09-02 RX ORDER — DEXTROSE 50 % IN WATER 50 %
25 SYRINGE (ML) INTRAVENOUS
Refills: 0 | Status: DISCONTINUED | OUTPATIENT
Start: 2020-09-02 | End: 2020-09-03

## 2020-09-02 RX ORDER — DEXTROSE 50 % IN WATER 50 %
50 SYRINGE (ML) INTRAVENOUS
Refills: 0 | Status: DISCONTINUED | OUTPATIENT
Start: 2020-09-02 | End: 2020-09-03

## 2020-09-02 RX ORDER — OXYCODONE HYDROCHLORIDE 5 MG/1
5 TABLET ORAL EVERY 6 HOURS
Refills: 0 | Status: DISCONTINUED | OUTPATIENT
Start: 2020-09-02 | End: 2020-09-03

## 2020-09-02 RX ORDER — CEFAZOLIN SODIUM 1 G
1000 VIAL (EA) INJECTION EVERY 8 HOURS
Refills: 0 | Status: COMPLETED | OUTPATIENT
Start: 2020-09-02 | End: 2020-09-03

## 2020-09-02 RX ORDER — INSULIN HUMAN 100 [IU]/ML
1 INJECTION, SOLUTION SUBCUTANEOUS
Qty: 100 | Refills: 0 | Status: DISCONTINUED | OUTPATIENT
Start: 2020-09-02 | End: 2020-09-03

## 2020-09-02 RX ORDER — ACETAMINOPHEN 500 MG
650 TABLET ORAL EVERY 6 HOURS
Refills: 0 | Status: DISCONTINUED | OUTPATIENT
Start: 2020-09-02 | End: 2020-09-03

## 2020-09-02 RX ORDER — NOREPINEPHRINE BITARTRATE/D5W 8 MG/250ML
0.05 PLASTIC BAG, INJECTION (ML) INTRAVENOUS
Qty: 8 | Refills: 0 | Status: DISCONTINUED | OUTPATIENT
Start: 2020-09-02 | End: 2020-09-03

## 2020-09-02 RX ORDER — MAGNESIUM SULFATE 500 MG/ML
1 VIAL (ML) INJECTION EVERY 12 HOURS
Refills: 0 | Status: DISCONTINUED | OUTPATIENT
Start: 2020-09-02 | End: 2020-09-03

## 2020-09-02 RX ORDER — CHLORHEXIDINE GLUCONATE 213 G/1000ML
1 SOLUTION TOPICAL
Refills: 0 | Status: DISCONTINUED | OUTPATIENT
Start: 2020-09-02 | End: 2020-09-03

## 2020-09-02 RX ORDER — ALBUTEROL 90 UG/1
2 AEROSOL, METERED ORAL EVERY 6 HOURS
Refills: 0 | Status: DISCONTINUED | OUTPATIENT
Start: 2020-09-02 | End: 2020-09-02

## 2020-09-02 RX ORDER — LISINOPRIL 2.5 MG/1
10 TABLET ORAL DAILY
Refills: 0 | Status: DISCONTINUED | OUTPATIENT
Start: 2020-09-03 | End: 2020-09-03

## 2020-09-02 RX ORDER — NITROGLYCERIN 6.5 MG
5 CAPSULE, EXTENDED RELEASE ORAL
Qty: 50 | Refills: 0 | Status: DISCONTINUED | OUTPATIENT
Start: 2020-09-02 | End: 2020-09-03

## 2020-09-02 RX ORDER — ATORVASTATIN CALCIUM 80 MG/1
20 TABLET, FILM COATED ORAL AT BEDTIME
Refills: 0 | Status: DISCONTINUED | OUTPATIENT
Start: 2020-09-02 | End: 2020-09-03

## 2020-09-02 RX ORDER — ONDANSETRON 8 MG/1
4 TABLET, FILM COATED ORAL ONCE
Refills: 0 | Status: COMPLETED | OUTPATIENT
Start: 2020-09-02 | End: 2020-09-02

## 2020-09-02 RX ORDER — IPRATROPIUM BROMIDE 0.2 MG/ML
2 SOLUTION, NON-ORAL INHALATION EVERY 6 HOURS
Refills: 0 | Status: DISCONTINUED | OUTPATIENT
Start: 2020-09-02 | End: 2020-09-02

## 2020-09-02 RX ORDER — NICARDIPINE HYDROCHLORIDE 30 MG/1
5 CAPSULE, EXTENDED RELEASE ORAL
Qty: 40 | Refills: 0 | Status: DISCONTINUED | OUTPATIENT
Start: 2020-09-02 | End: 2020-09-03

## 2020-09-02 RX ORDER — DEXMEDETOMIDINE HYDROCHLORIDE IN 0.9% SODIUM CHLORIDE 4 UG/ML
0.2 INJECTION INTRAVENOUS
Qty: 200 | Refills: 0 | Status: DISCONTINUED | OUTPATIENT
Start: 2020-09-02 | End: 2020-09-02

## 2020-09-02 RX ORDER — ROSUVASTATIN CALCIUM 5 MG/1
1 TABLET ORAL
Qty: 0 | Refills: 0 | DISCHARGE

## 2020-09-02 RX ORDER — CLOPIDOGREL BISULFATE 75 MG/1
75 TABLET, FILM COATED ORAL DAILY
Refills: 0 | Status: DISCONTINUED | OUTPATIENT
Start: 2020-09-02 | End: 2020-09-02

## 2020-09-02 RX ADMIN — Medication 100 MILLIGRAM(S): at 21:33

## 2020-09-02 RX ADMIN — ATORVASTATIN CALCIUM 20 MILLIGRAM(S): 80 TABLET, FILM COATED ORAL at 21:33

## 2020-09-02 RX ADMIN — Medication 81 MILLIGRAM(S): at 08:45

## 2020-09-02 RX ADMIN — Medication 100 GRAM(S): at 18:44

## 2020-09-02 RX ADMIN — CLOPIDOGREL BISULFATE 75 MILLIGRAM(S): 75 TABLET, FILM COATED ORAL at 08:45

## 2020-09-02 RX ADMIN — Medication 100 MILLIGRAM(S): at 14:00

## 2020-09-02 RX ADMIN — ONDANSETRON 104 MILLIGRAM(S): 8 TABLET, FILM COATED ORAL at 19:15

## 2020-09-02 NOTE — DISCHARGE NOTE PROVIDER - NSDCCPTREATMENT_GEN_ALL_CORE_FT
PRINCIPAL PROCEDURE  Procedure: Percutaneous transcatheter aortic valve replacement (TAVR) by femoral artery approach  Findings and Treatment: Utilizing a 34 mm Medtronic Evolut PRO Transcatheter Aortic Valve; Serial # [id number]

## 2020-09-02 NOTE — H&P ADULT - HISTORY OF PRESENT ILLNESS
80 yr old male with PMH of HTN, DLD, CKD3, moderate AS, hemochromatosis came here for elective RHC and LHC. Pt c/o significant dyspnea on mild exertion and had abnormal stress test as outpt. Pt presented today for cardiac cath which revealed nonobstructive CAD. CT surgery consulted for moderate AS.  The patient had pre-operative work-up for aortic valve replacement and presents today for elective TAVR.

## 2020-09-02 NOTE — BRIEF OPERATIVE NOTE - NSICDXBRIEFPROCEDURE_GEN_ALL_CORE_FT
PROCEDURES:  Insertion of DDD cardiac pacemaker 02-Sep-2020 12:42:18  Blake Israel
PROCEDURES:  Percutaneous transcatheter aortic valve replacement (TAVR) by femoral artery approach 02-Sep-2020 10:28:25 Utilizing a 34 mm Medtronic Evolut PRO Transcatheter Aortic Valve; Serial # [id number] Vimal Estrada

## 2020-09-02 NOTE — DISCHARGE NOTE PROVIDER - NSDCFUADDAPPT_GEN_ALL_CORE_FT
Follow up at [facility] on September  at Follow up at [facility] on September 11  at 2:10pm.  Please also follow up with cardiology in 2 weeks and with pmd in 2-4 weeks.    Pt also needs to follow up with EP in 4 weeks for device check

## 2020-09-02 NOTE — DISCHARGE NOTE PROVIDER - NSDCACTIVITY_GEN_ALL_CORE
Walking - Outdoors allowed/Do not drive or operate machinery/No heavy lifting/straining/Stairs allowed/Showering allowed/Do not make important decisions/Walking - Indoors allowed

## 2020-09-02 NOTE — PROGRESS NOTE ADULT - SUBJECTIVE AND OBJECTIVE BOX
Patient underwent TAVR without complication.  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    Very high risk for PPM was discussed with patient, Dr. Israel, and referring cardiologist prior to procedure, with plan for implant following TAVR if AV block occurred during case.    Patient experienced CHB with intermittent PPM dependency after THV post-dilatation.  He was moved from the Cath Lab to the CT OR for logistical reasons.  I believe PPM remains indicated and should proceed as planned.

## 2020-09-02 NOTE — DISCHARGE NOTE PROVIDER - CARE PROVIDER_API CALL
Chivo Blake J  GERIATRIC MEDICINE  [address] [place] [address]  Phone: [phone]  Fax: [phone]  Follow Up Time: 1 month    Kay Cason)  Cardiology; Interventional Cardiology  [address] [place] [address]  Phone: [phone]  Fax: [phone]  Follow Up Time: 1 month    [facility],   [address] [place] [address], [facility] [address]  [place] [address]  Phone: [phone]  Fax: (   )    -  Follow Up Time: Chivo Blake  GERIATRIC MEDICINE  [address] [place] [address]  Phone: [phone]  Fax: [phone]  Follow Up Time: 1 month    Kay Cason)  Cardiology; Interventional Cardiology  [address] [place] [address]  Phone: [phone]  Fax: [phone]  Follow Up Time: 1 month    [facility],   [address] [place] [address], [address]  [place] [address]  Phone: [phone]  Fax: (   )    -  Follow Up Time:     Virgilio Ontiveros)  Cardiovascular Disease; Internal Medicine  [address] [place] [address], [address]  [place] [address]  Phone: [phone]  Fax: [phone]  Follow Up Time: Chivo Blake  GERIATRIC MEDICINE  [address] [place] [address]  Phone: [phone]  Fax: [phone]  Follow Up Time: 1 month    Kay Cason)  Cardiology; Interventional Cardiology  [address] [place] [address]  Phone: [phone]  Fax: [phone]  Follow Up Time: 1 month    Virgilio Ontiveros)  Cardiovascular Disease; Internal Medicine  [address] [facility] [address]  [place] [address]  Phone: [phone]  Fax: [phone]  Follow Up Time:     [facility],   [address] [place] [address], [address]  [place] [address]  Phone: [phone]  Fax: (   )    -  Follow Up Time:     Dariel Hoover; LATRELL)  Cardiac Electrophysiology  [address] [place] [address] [address]  [place] [address]  Phone: [phone]  Fax: [phone]  Scheduled Appointment: 10/06/2020 10:30 AM

## 2020-09-02 NOTE — DISCHARGE NOTE PROVIDER - CARE PROVIDERS DIRECT ADDRESSES
,[email or web address],DirectAddress_[email or web address],DirectAddress_Unknown ,[email or web address],DirectAddress_[email or web address]_Unknown,[email or web address] ,[email or web address],DirectAddress_Unknown,[email or web address],DirectAddress_Unknown,[email or web address]

## 2020-09-02 NOTE — BRIEF OPERATIVE NOTE - OPERATION/FINDINGS
Temporary pacer needed due to severe bradycardia Temporary pacer needed due to severe bradycardia, anticipated preoperatively    Post implant BAV with 24 mm Cheikh Balloon

## 2020-09-02 NOTE — DISCHARGE NOTE PROVIDER - HOSPITAL COURSE
80 yr old male with PMH of HTN, CAD (s/p PCI), DLD, CKD3, Right ICA stenosis, moderate AS, hemochromatosis had recent RHC and LHC (non occlusive cad) as part of TAVR work-up. Pt c/o significant dyspnea on mild exertion and had abnormal stress test as outpt. Pt presents today TAVR. His post-op course was 80 yr old male with PMH of HTN, CAD (s/p PCI), DLD, CKD3, Right ICA stenosis, moderate AS, hemochromatosis had recent RHC and LHC (non occlusive cad) as part of TAVR work-up. Pt c/o significant dyspnea on mild exertion and had abnormal stress test as outpt. Pt presents today TAVR after he was found to have severe aortic stenosis. Intra-op, the patient developed complete heart block and had a PPM inserted intra-operatively.  His post-op course was uneventful and he was discharged home in stable condition on POD #1.

## 2020-09-02 NOTE — H&P ADULT - NSICDXPASTMEDICALHX_GEN_ALL_CORE_FT
PAST MEDICAL HISTORY:  Angina at rest     Aortic stenosis     CAD (coronary artery disease)     Hemochromatosis     High cholesterol     Hypertension     Stented coronary artery

## 2020-09-02 NOTE — DISCHARGE NOTE PROVIDER - NSDCFUSCHEDAPPT_GEN_ALL_CORE_FT
MARIA LUISA COATS ; 09/11/2020 ; [facility] [address] [facility]  MARIA LUISA COATS ; 10/07/2020 ; [facility] [address] [facility] [address] MARIA LUISA COATS ; 09/11/2020 ; [facility] [address] MARIA LUISA Amos ; 10/06/2020 ; [facility] [address] [facility] MARIA LUISA Amos ; 10/07/2020 ; [facility] Card[facility] [address] [facility] [address]

## 2020-09-02 NOTE — BRIEF OPERATIVE NOTE - NSICDXBRIEFPREOP_GEN_ALL_CORE_FT
PRE-OP DIAGNOSIS:  CHB (complete heart block) 02-Sep-2020 12:42:33  Blake Israel
PRE-OP DIAGNOSIS:  Aortic stenosis 02-Sep-2020 10:30:06  Vimal Estrada

## 2020-09-02 NOTE — H&P ADULT - NSHPLABSRESULTS_GEN_ALL_CORE
LABS:                        13.5   2.96  )-----------( 281      ( 14 Aug 2020 07:19 )             36.8     08-14    137  |  104  |  31<H>  ----------------------------<  111<H>  4.6   |  21  |  1.6<H>    Ca    8.8      14 Aug 2020 07:19          Cardiac Cath: nonobstructive CAD    TTE / BRITNI: EF 55%, trace MR, mild TR, moderate AS- TREV 1.6cm2, Peak gradient 33.8 mmHg, mean gradient 21.4 mmHg

## 2020-09-02 NOTE — BRIEF OPERATIVE NOTE - NSICDXBRIEFPOSTOP_GEN_ALL_CORE_FT
POST-OP DIAGNOSIS:  CHB (complete heart block) 02-Sep-2020 12:42:42  Blake Israel
POST-OP DIAGNOSIS:  Aortic stenosis 02-Sep-2020 10:30:17  Vimal Estrada

## 2020-09-02 NOTE — H&P ADULT - NSHPPHYSICALEXAM_GEN_ALL_CORE
PHYSICAL EXAM    CONSTITUTIONAL:                                                                          WNL[ x]   Neuro: WNL[x ] Normal exam oriented to person/place & time with no focal motor or sensory  deficits. Other                     Eyes: WNL[x ]   Normal exam of conjunctiva & lids, pupils equally reactive. Other     ENT: WNL[x ]    Normal exam of nasal/oral mucosa with absence of cyanosis. Other  Neck: WNL[x ]  Normal exam of jugular veins, trachea & thyroid. Other  Chest: WNL[x ] Normal lung exam with good air movement absence of wheezes, rales, or rhonchi: Other                                                                                CV:  Auscultation: normal [x ] S3[ ] S4[ ] Irregular [ ] Rub[ ] Clicks[ ]    Murmurs none:[ ]systolic [x ]  diastolic [ ] holosystolic [ ]  Carotids: No Bruits[ ] Other                 Abdominal Aorta: normal [ ] nonpalpable[ ]Other                                                                                      GI:           WNL[x ] Normal exam of abdomen, liver & spleen with no noted masses or tenderness. Other                                                                                                        Extremities: WNL[ x] Normal no evidence of cyanosis or deformity Edema: none[ ]trace[ ]1+[ ]2+[ ]3+[ ]4+[ ]  Lower Extremity Pulses: Right[ ] Left[ ]Varicosities[ ]  SKIN :WNL[x ] Normal exam to inspection & palation. Other:

## 2020-09-02 NOTE — H&P ADULT - NSHPREVIEWOFSYSTEMS_GEN_ALL_CORE
Review of Systems  CONSTITUTIONAL:  Fevers[ ] chills[ ] sweats[ ] fatigue[ ] weight loss[ ] weight gain [ ]                                     NEGATIVE [X ]   NEURO:  parathesias[ ] seizures [ ]  syncope [ ]  confusion [ ]                                                                                NEGATIVE[ X]   EYES: glasses[ ]  blurry vision[ ]  discharge[ ] pain[ ] glaucoma [ ]                                                                          NEGATIVE[X ]   ENMT:  difficulty hearing [ ]  vertigo[ ]  dysphagia[ ] epistaxis[ ] recent dental work [ ]                                    NEGATIVE[ X]   CV:  chest pain[ ] palpitations[ ] PZA [ ] diaphoresis [ ]                                                                                           NEGATIVE[ X]   RESPIRATORY:  wheezing[ ] SOB[ ] cough [ ] sputum[ ] hemoptysis[ ]                                                                  NEGATIVE[ ]   GI:  nausea[ ]  vommiting [ ]  diarrhea[ ] constipation [ ] melena [ ]                                                                      NEGATIVE[ X]   : hematuria[ ]  dysuria[ ] urgency[ ] incontinence[ ]                                                                                            NEGATIVE[ X]   MUSKULOSKELETAL:  arthritis[ ]  joint swelling [ ] muscle weakness [ ] Hx vein stripping [ ]                             NEGATIVE[X ]   SKIN/BREAST:  rash[ ] itching [ ]  hair loss[ ] masses[ ]                                                                                              NEGATIVE[ X]   PSYCH:  dementia [ ] depresion [ ] anxiety[ ]                                                                                                               NEGATIVE[X ]   HEME/LYMPH:  bruises easily[ ] enlarged lymph nodes[ ] tender lymph nodes[ ]                                               NEGATIVE[ X]   ENDOCRINE:  cold intolerance[ ] heat intolerance[ ] polydipsia[ ]                                                                          NEGATIVE[ X]

## 2020-09-02 NOTE — DISCHARGE NOTE PROVIDER - NSDCMRMEDTOKEN_GEN_ALL_CORE_FT
Aspirin Low Dose 81 mg oral delayed release tablet: 1 tab(s) orally once a day  clopidogrel 75 mg oral tablet: 1 tab(s) orally once a day  Crestor 5 mg oral tablet: 1 tab(s) orally once a day  hydroxyurea 500 mg oral capsule: 500 milligram(s) orally once a day  lisinopril 10 mg oral tablet: 1 tab(s) orally once a day  Ranexa 1000 mg oral tablet, extended release: 1 tab(s) orally 2 times a day  Zetia 10 mg oral tablet: 1 tab(s) orally once a day acetaminophen 325 mg oral tablet: 2 tab(s) orally every 6 hours, As needed, Temp greater or equal to 38C (100.4F), Mild Pain (1 - 3)  aspirin 81 mg oral delayed release tablet: 1 tab(s) orally once a day  clopidogrel 75 mg oral tablet: 1 tab(s) orally once a day  Crestor 5 mg oral tablet: 1 tab(s) orally once a day  hydroxyurea 500 mg oral capsule: 1 cap(s) orally once a day  lisinopril 10 mg oral tablet: 1 tab(s) orally once a day  ranolazine 500 mg oral tablet, extended release: 1 tab(s) orally 2 times a day  Roxicodone 5 mg oral tablet: 1 tab(s) orally every 6 hours, As Needed -Moderate Pain (4 - 6) - for moderate pain MDD:4  Zetia 10 mg oral tablet: 1 tab(s) orally once a day

## 2020-09-02 NOTE — DISCHARGE NOTE PROVIDER - PROVIDER TOKENS
PROVIDER:[TOKEN:[[id number]],FOLLOWUP:[1 month]],PROVIDER:[TOKEN:[[id number]],FOLLOWUP:[1 month]],FREE:[LAST:[[facility]],PHONE:[phone]:[(   )    -],ADDRESS:[[address] [place] [address]]] PROVIDER:[TOKEN:[[id number]],FOLLOWUP:[1 month]],PROVIDER:[TOKEN:[[id number]],FOLLOWUP:[1 month]],FREE:[LAST:[[facility]],PHONE:[phone]],FAX:[(   )    -],ADDRESS:[[address] [place] [address]]],PROVIDER:[TOKEN:[[id number]]] PROVIDER:[TOKEN:[[id number]],FOLLOWUP:[1 month]],PROVIDER:[TOKEN:[[id number]],FOLLOWUP:[1 month]],PROVIDER:[TOKEN:[[id number]]],FREE:[LAST:[[facility]],PHONE:[phone]],FAX:[(   )    -],ADDRESS:[[address] [place] [address]]],PROVIDER:[TOKEN:[[id number]],SCHEDULEDAPPT:[10/06/2020],SCHEDULEDAPPTTIME:[10:30 AM]]

## 2020-09-02 NOTE — CONSULT NOTE ADULT - SUBJECTIVE AND OBJECTIVE BOX
CTS Attending  ----------------    Patient is an 80 yr old male undergoint TAVR who has underlying conduction system disease and has high probability of requiring permanent pacemaker.  Indeed, post TAVR he required transvenous pacing and urgent permanent device implantation was required  As specified in the preoperative consent and evaluation, patient was aware and gave permission for PPM implant  Will proceed as the patient was transferred to the CTOR from the cath lab to undergo surgery.    -FMR

## 2020-09-02 NOTE — H&P ADULT - NSHPSOCIALHISTORY_GEN_ALL_CORE
SOCIAL HISTORY:  Smoker: [ ] Yes  [ x] No        PACK YEARS:                         WHEN QUIT?  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

## 2020-09-03 ENCOUNTER — TRANSCRIPTION ENCOUNTER (OUTPATIENT)
Age: 80
End: 2020-09-03

## 2020-09-03 VITALS
OXYGEN SATURATION: 99 % | SYSTOLIC BLOOD PRESSURE: 121 MMHG | HEART RATE: 101 BPM | DIASTOLIC BLOOD PRESSURE: 59 MMHG | RESPIRATION RATE: 33 BRPM

## 2020-09-03 DIAGNOSIS — I25.89 OTHER FORMS OF CHRONIC ISCHEMIC HEART DISEASE: ICD-10-CM

## 2020-09-03 DIAGNOSIS — M79.89 OTHER SPECIFIED SOFT TISSUE DISORDERS: ICD-10-CM

## 2020-09-03 LAB
ALBUMIN SERPL ELPH-MCNC: 3.6 G/DL — SIGNIFICANT CHANGE UP (ref 3.5–5.2)
ALP SERPL-CCNC: 48 U/L — SIGNIFICANT CHANGE UP (ref 30–115)
ALT FLD-CCNC: 9 U/L — SIGNIFICANT CHANGE UP (ref 0–41)
ANION GAP SERPL CALC-SCNC: 10 MMOL/L — SIGNIFICANT CHANGE UP (ref 7–14)
APTT BLD: 28.9 SEC — SIGNIFICANT CHANGE UP (ref 27–39.2)
AST SERPL-CCNC: 25 U/L — SIGNIFICANT CHANGE UP (ref 0–41)
BASOPHILS # BLD AUTO: 0.01 K/UL — SIGNIFICANT CHANGE UP (ref 0–0.2)
BASOPHILS NFR BLD AUTO: 0.3 % — SIGNIFICANT CHANGE UP (ref 0–1)
BILIRUB SERPL-MCNC: 1 MG/DL — SIGNIFICANT CHANGE UP (ref 0.2–1.2)
BUN SERPL-MCNC: 26 MG/DL — HIGH (ref 10–20)
CALCIUM SERPL-MCNC: 8.2 MG/DL — LOW (ref 8.5–10.1)
CHLORIDE SERPL-SCNC: 104 MMOL/L — SIGNIFICANT CHANGE UP (ref 98–110)
CO2 SERPL-SCNC: 22 MMOL/L — SIGNIFICANT CHANGE UP (ref 17–32)
CREAT SERPL-MCNC: 1.5 MG/DL — SIGNIFICANT CHANGE UP (ref 0.7–1.5)
EOSINOPHIL # BLD AUTO: 0.01 K/UL — SIGNIFICANT CHANGE UP (ref 0–0.7)
EOSINOPHIL NFR BLD AUTO: 0.3 % — SIGNIFICANT CHANGE UP (ref 0–8)
GAS PNL BLDA: SIGNIFICANT CHANGE UP
GLUCOSE BLDC GLUCOMTR-MCNC: 118 MG/DL — HIGH (ref 70–99)
GLUCOSE BLDC GLUCOMTR-MCNC: 135 MG/DL — HIGH (ref 70–99)
GLUCOSE SERPL-MCNC: 111 MG/DL — HIGH (ref 70–99)
HCT VFR BLD CALC: 27.8 % — LOW (ref 42–52)
HGB BLD-MCNC: 9.8 G/DL — LOW (ref 14–18)
IMM GRANULOCYTES NFR BLD AUTO: 0.5 % — HIGH (ref 0.1–0.3)
INR BLD: 1.01 RATIO — SIGNIFICANT CHANGE UP (ref 0.65–1.3)
LYMPHOCYTES # BLD AUTO: 0.32 K/UL — LOW (ref 1.2–3.4)
LYMPHOCYTES # BLD AUTO: 8.2 % — LOW (ref 20.5–51.1)
MAGNESIUM SERPL-MCNC: 2.2 MG/DL — SIGNIFICANT CHANGE UP (ref 1.8–2.4)
MCHC RBC-ENTMCNC: 35.3 G/DL — SIGNIFICANT CHANGE UP (ref 32–37)
MCHC RBC-ENTMCNC: 50.8 PG — HIGH (ref 27–31)
MCV RBC AUTO: 144 FL — HIGH (ref 80–94)
MONOCYTES # BLD AUTO: 0.35 K/UL — SIGNIFICANT CHANGE UP (ref 0.1–0.6)
MONOCYTES NFR BLD AUTO: 9 % — SIGNIFICANT CHANGE UP (ref 1.7–9.3)
NEUTROPHILS # BLD AUTO: 3.19 K/UL — SIGNIFICANT CHANGE UP (ref 1.4–6.5)
NEUTROPHILS NFR BLD AUTO: 81.7 % — HIGH (ref 42.2–75.2)
NRBC # BLD: 0 /100 WBCS — SIGNIFICANT CHANGE UP (ref 0–0)
PLATELET # BLD AUTO: 90 K/UL — LOW (ref 130–400)
POTASSIUM SERPL-MCNC: 4.4 MMOL/L — SIGNIFICANT CHANGE UP (ref 3.5–5)
POTASSIUM SERPL-SCNC: 4.4 MMOL/L — SIGNIFICANT CHANGE UP (ref 3.5–5)
PROT SERPL-MCNC: 5.8 G/DL — LOW (ref 6–8)
PROTHROM AB SERPL-ACNC: 11.6 SEC — SIGNIFICANT CHANGE UP (ref 9.95–12.87)
RBC # BLD: 1.93 M/UL — LOW (ref 4.7–6.1)
RBC # FLD: 12.5 % — SIGNIFICANT CHANGE UP (ref 11.5–14.5)
SODIUM SERPL-SCNC: 136 MMOL/L — SIGNIFICANT CHANGE UP (ref 135–146)
WBC # BLD: 3.9 K/UL — LOW (ref 4.8–10.8)
WBC # FLD AUTO: 3.9 K/UL — LOW (ref 4.8–10.8)

## 2020-09-03 PROCEDURE — 71045 X-RAY EXAM CHEST 1 VIEW: CPT | Mod: 26

## 2020-09-03 PROCEDURE — 99222 1ST HOSP IP/OBS MODERATE 55: CPT

## 2020-09-03 PROCEDURE — 93010 ELECTROCARDIOGRAM REPORT: CPT

## 2020-09-03 PROCEDURE — 93306 TTE W/DOPPLER COMPLETE: CPT | Mod: 26

## 2020-09-03 PROCEDURE — 99232 SBSQ HOSP IP/OBS MODERATE 35: CPT

## 2020-09-03 PROCEDURE — 93280 PM DEVICE PROGR EVAL DUAL: CPT | Mod: 26

## 2020-09-03 RX ORDER — LISINOPRIL 2.5 MG/1
1 TABLET ORAL
Qty: 0 | Refills: 0 | DISCHARGE

## 2020-09-03 RX ORDER — HYDROXYUREA 500 MG/1
500 CAPSULE ORAL
Qty: 0 | Refills: 0 | DISCHARGE

## 2020-09-03 RX ORDER — CLOPIDOGREL BISULFATE 75 MG/1
1 TABLET, FILM COATED ORAL
Qty: 0 | Refills: 0 | DISCHARGE

## 2020-09-03 RX ORDER — LISINOPRIL 2.5 MG/1
1 TABLET ORAL
Qty: 0 | Refills: 0 | DISCHARGE
Start: 2020-09-03

## 2020-09-03 RX ORDER — ACETAMINOPHEN 500 MG
2 TABLET ORAL
Qty: 0 | Refills: 0 | DISCHARGE
Start: 2020-09-03

## 2020-09-03 RX ORDER — ASPIRIN/CALCIUM CARB/MAGNESIUM 324 MG
1 TABLET ORAL
Qty: 0 | Refills: 0 | DISCHARGE

## 2020-09-03 RX ORDER — RANOLAZINE 500 MG/1
1 TABLET, FILM COATED, EXTENDED RELEASE ORAL
Qty: 0 | Refills: 0 | DISCHARGE

## 2020-09-03 RX ORDER — CLOPIDOGREL BISULFATE 75 MG/1
1 TABLET, FILM COATED ORAL
Qty: 0 | Refills: 0 | DISCHARGE
Start: 2020-09-03

## 2020-09-03 RX ORDER — HYDROXYUREA 500 MG/1
1 CAPSULE ORAL
Qty: 0 | Refills: 0 | DISCHARGE
Start: 2020-09-03

## 2020-09-03 RX ORDER — OXYCODONE HYDROCHLORIDE 5 MG/1
1 TABLET ORAL
Qty: 15 | Refills: 0
Start: 2020-09-03

## 2020-09-03 RX ORDER — ASPIRIN/CALCIUM CARB/MAGNESIUM 324 MG
1 TABLET ORAL
Qty: 0 | Refills: 0 | DISCHARGE
Start: 2020-09-03

## 2020-09-03 RX ORDER — RANOLAZINE 500 MG/1
1 TABLET, FILM COATED, EXTENDED RELEASE ORAL
Qty: 0 | Refills: 0 | DISCHARGE
Start: 2020-09-03

## 2020-09-03 RX ORDER — RANOLAZINE 500 MG/1
500 TABLET, FILM COATED, EXTENDED RELEASE ORAL
Refills: 0 | Status: DISCONTINUED | OUTPATIENT
Start: 2020-09-03 | End: 2020-09-03

## 2020-09-03 RX ORDER — CLOPIDOGREL BISULFATE 75 MG/1
75 TABLET, FILM COATED ORAL DAILY
Refills: 0 | Status: DISCONTINUED | OUTPATIENT
Start: 2020-09-03 | End: 2020-09-03

## 2020-09-03 RX ADMIN — CLOPIDOGREL BISULFATE 75 MILLIGRAM(S): 75 TABLET, FILM COATED ORAL at 10:43

## 2020-09-03 RX ADMIN — Medication 81 MILLIGRAM(S): at 10:43

## 2020-09-03 RX ADMIN — LISINOPRIL 10 MILLIGRAM(S): 2.5 TABLET ORAL at 06:04

## 2020-09-03 RX ADMIN — Medication 100 MILLIGRAM(S): at 06:03

## 2020-09-03 RX ADMIN — RANOLAZINE 1000 MILLIGRAM(S): 500 TABLET, FILM COATED, EXTENDED RELEASE ORAL at 06:04

## 2020-09-03 RX ADMIN — Medication 100 GRAM(S): at 06:04

## 2020-09-03 RX ADMIN — CHLORHEXIDINE GLUCONATE 1 APPLICATION(S): 213 SOLUTION TOPICAL at 06:04

## 2020-09-03 NOTE — CONSULT NOTE ADULT - ASSESSMENT
A/P   Patient s/p ICD/ PPM/ BiV- ICD implant    - CXR  - Device interrogation done, awaiting review by attending    - FU in the EP office for wound check with 10/6 10:30AM with Cassidy NP  Dr Hoover office  [address] [place] [address], [address]  [phone]     No Heavy lifting >5 lbs. Do not raise your arm above shoulder level for 4-6 weeks.   No driving for 2weeks  No shower, bathtub, no wetting device site for 5 days.  Can take a shower on _  _ , remove dressing at that time, leave Steri-strips in place  Wound and dressing instructions per Dr Israel team A/P   79yo MAle with severe AS s/p TAVR with CHB inraop,s/p DC PPM (Medtronic)      - Device interrogation done, working properly    - FU in the EP office for wound check with 10/6 10:30AM with Cassidy NP  Dr Hoover office  [address] [place] [address], Suite [facility]  [phone]     No Heavy lifting >5 lbs. Do not raise your arm above shoulder level for 4-6 weeks.   No driving for 2weeks  Wound and dressing instructions per Dr Israel team A/P   79yo MAle with severe AS s/p TAVR with CHB inraop,s/p DC PPM (Medtronic)    - Tele and ECG reviewed (A-sensed, V-paced --> long AV delay)  - Device interrogation done, working properly    - FU in the EP office for wound check with 10/6 10:30AM with Cassidy NP  Dr Hoover office  [address] [place] [address], [address]  [phone]     No Heavy lifting >5 lbs. Do not raise your arm above shoulder level for 4-6 weeks.   No driving for 2weeks  Wound and dressing instructions per Dr Israel team

## 2020-09-03 NOTE — CONSULT NOTE ADULT - SUBJECTIVE AND OBJECTIVE BOX
Patient is a 80y old  Male who presents with a chief complaint of Elective TAVR (03 Sep 2020 08:29)        HPI:  80 yr old male with PMH of HTN, DLD, CKD3, moderate AS, hemochromatosis came here for elective RHC and LHC. Pt c/o significant dyspnea on mild exertion and had abnormal stress test as outpt. Pt presented today for cardiac cath which revealed nonobstructive CAD. CT surgery consulted for moderate AS.  The patient had pre-operative work-up for aortic valve replacement and presents today for elective TAVR. (02 Sep 2020 07:36)      Electrophysiology:  80y Male    REVIEW OF SYSTEMS    [ ] A ten-point review of systems was otherwise negative except as noted.  [ ] Due to altered mental status/intubation, subjective information were not able to be obtained from the patient. History was obtained, to the extent possible, from review of the chart and collateral sources of information.      PAST MEDICAL & SURGICAL HISTORY:  Aortic stenosis  Hypertension  High cholesterol  Hemochromatosis  Angina at rest  Stented coronary artery  CAD (coronary artery disease)  Ankle injury  Total knee replacement status, bilateral      Home Medications:  acetaminophen 325 mg oral tablet: 2 tab(s) orally every 6 hours, As needed, Temp greater or equal to 38C (100.4F), Mild Pain (1 - 3) (03 Sep 2020 11:32)  aspirin 81 mg oral delayed release tablet: 1 tab(s) orally once a day (03 Sep 2020 11:32)  clopidogrel 75 mg oral tablet: 1 tab(s) orally once a day (03 Sep 2020 11:32)  Crestor 5 mg oral tablet: 1 tab(s) orally once a day (02 Sep 2020 07:14)  hydroxyurea 500 mg oral capsule: 1 cap(s) orally once a day (03 Sep 2020 11:32)  lisinopril 10 mg oral tablet: 1 tab(s) orally once a day (03 Sep 2020 11:32)  ranolazine 500 mg oral tablet, extended release: 1 tab(s) orally 2 times a day (03 Sep 2020 11:32)  Zetia 10 mg oral tablet: 1 tab(s) orally once a day (02 Sep 2020 07:14)      Allergies:  latex (Swelling)  No Known Drug Allergies      FAMILY HISTORY:      SOCIAL HISTORY:    CIGARETTES:  ALCOHOL:        PREVIOUS DIAGNOSTIC TESTING:      ECHO  FINDINGS:    STRESS  FINDINGS:    CATHETERIZATION  FINDINGS:    ELECTROPHYSIOLOGY STUDY  FINDINGS:    CAROTID ULTRASOUND:  FINDINGS    VENOUS DUPLEX SCAN:  FINDINGS:    CHEST CT PULMONARY ANGIO with IV Contrast:  FINDINGS:      MEDICATIONS  (STANDING):  albuterol/ipratropium for Nebulization 3 milliLiter(s) Nebulizer every 6 hours  aspirin enteric coated 81 milliGRAM(s) Oral daily  atorvastatin 20 milliGRAM(s) Oral at bedtime  chlorhexidine 4% Liquid 1 Application(s) Topical <User Schedule>  clopidogrel Tablet 75 milliGRAM(s) Oral daily  dextrose 50% Injectable 50 milliLiter(s) IV Push every 15 minutes  dextrose 50% Injectable 25 milliLiter(s) IV Push every 15 minutes  hydroxyurea 500 milliGRAM(s) Oral daily  lisinopril 10 milliGRAM(s) Oral daily  magnesium sulfate  IVPB 1 Gram(s) IV Intermittent every 12 hours  ranolazine 500 milliGRAM(s) Oral two times a day    MEDICATIONS  (PRN):  acetaminophen   Tablet .. 650 milliGRAM(s) Oral every 6 hours PRN Temp greater or equal to 38C (100.4F), Mild Pain (1 - 3)  fentaNYL    Injectable 25 MICROGram(s) IV Push once PRN Moderate Pain (4 - 6)  oxyCODONE    IR 5 milliGRAM(s) Oral every 6 hours PRN Moderate Pain (4 - 6)      Vital Signs Last 24 Hrs  T(C): 37.1 (03 Sep 2020 08:00), Max: 37.1 (03 Sep 2020 08:00)  T(F): 98.7 (03 Sep 2020 08:00), Max: 98.7 (03 Sep 2020 08:00)  HR: 101 (03 Sep 2020 10:25) (59 - 101)  BP: 121/59 (03 Sep 2020 10:25) (112/55 - 126/75)  BP(mean): 85 (03 Sep 2020 10:25) (79 - 95)  RR: 33 (03 Sep 2020 10:25) (14 - 33)  SpO2: 99% (03 Sep 2020 10:25) (84% - 100%)    PHYSICAL EXAM:    GENERAL: In no apparent distress, well nourished, and hydrated.  HEAD:  Atraumatic, Normocephalic  EYES: EOMI, PERRLA, conjunctiva and sclera clear  NECK: Supple and normal thyroid.  No JVD or carotid bruit.  Carotid pulse is 2+ bilaterally.  HEART: Regular rate and rhythm; No murmurs, rubs, or gallops.  PULMONARY: Clear to auscultation and perfusion.  No rales, wheezing, or rhonchi bilaterally.  ABDOMEN: Soft, Nontender, Nondistended; Bowel sounds present  EXTREMITIES:  2+ Peripheral Pulses, No clubbing, cyanosis, or edema  NEUROLOGICAL: Grossly nonfocal    I&O's Detail    02 Sep 2020 07:01  -  03 Sep 2020 07:00  --------------------------------------------------------  IN:    insulin regular Infusion: 10 mL    IV PiggyBack: 300 mL    Oral Fluid: 640 mL    sodium chloride 0.9%: 100 mL  Total IN: 1050 mL    OUT:    Incontinent per Condom Catheter: 1670 mL  Total OUT: 1670 mL    Total NET: -620 mL      03 Sep 2020 07:01  -  03 Sep 2020 13:18  --------------------------------------------------------  IN:    Oral Fluid: 240 mL  Total IN: 240 mL    OUT:    Voided: 300 mL  Total OUT: 300 mL    Total NET: -60 mL        Daily     Daily Weight in k.2 (03 Sep 2020 06:00)    INTERPRETATION OF TELEMETRY:    EC Lead ECG:   Ventricular Rate 67 BPM    Atrial Rate 67 BPM    P-R Interval 172 ms    QRS Duration 166 ms    Q-T Interval 470 ms    QTC Calculation(Rameshet) 496 ms    P Axis 85 degrees    R Axis -77 degrees    T Axis 70 degrees    Diagnosis Line Atrial-sensed ventricular-paced rhythm  Abnormal ECG    Confirmed by Angel Yao ([id number]) on 9/3/2020 9:17:39 AM (20 @ 08:50)        LABS:                        9.8    3.90  )-----------( 90       ( 03 Sep 2020 03:10 )             27.8         136  |  104  |  26<H>  ----------------------------<  111<H>  4.4   |  22  |  1.5    Ca    8.2<L>      03 Sep 2020 03:10  Mg     2.2         TPro  5.8<L>  /  Alb  3.6  /  TBili  1.0  /  DBili  x   /  AST  25  /  ALT  9   /  AlkPhos  48          PT/INR - ( 03 Sep 2020 03:10 )   PT: 11.60 sec;   INR: 1.01 ratio         PTT - ( 03 Sep 2020 03:10 )  PTT:28.9 sec    BNP            RADIOLOGY & ADDITIONAL STUDIES: Patient is a 80y old  Male who presents with a chief complaint of Elective TAVR (03 Sep 2020 08:29)        HPI:  80 yr old male with PMH of HTN, DLD, CKD3, moderate AS, hemochromatosis came here for elective RHC and LHC. Pt c/o significant dyspnea on mild exertion and had abnormal stress test as outpt. Pt presented today for cardiac cath which revealed nonobstructive CAD. CT surgery consulted for moderate AS.  The patient had pre-operative work-up for aortic valve replacement and presents today for elective TAVR. (02 Sep 2020 07:36)      Electrophysiology:  80y Male with HTN, HLD, CKD3, hemochromatosis recent abnormal stress test and subsequent R+LHC revealing severe AS, underwent elective TAVR. Intraop patient developed complete heart block and DC PPM was placed at that time  Patient denies recent syncopal episodes dizziness, lightheadedness. In the past ~2016 had syncopal episode and had event monitor at that time that did not reveal any arrhythmias.  Device was interrogated post op, working properly    REVIEW OF SYSTEMS    [x] A ten-point review of systems was otherwise negative except as noted.      PAST MEDICAL & SURGICAL HISTORY:  Aortic stenosis  Hypertension  High cholesterol  Hemochromatosis  Angina at rest  Stented coronary artery  CAD (coronary artery disease)  Ankle injury  Total knee replacement status, bilateral      Home Medications:  acetaminophen 325 mg oral tablet: 2 tab(s) orally every 6 hours, As needed, Temp greater or equal to 38C (100.4F), Mild Pain (1 - 3) (03 Sep 2020 11:32)  aspirin 81 mg oral delayed release tablet: 1 tab(s) orally once a day (03 Sep 2020 11:32)  clopidogrel 75 mg oral tablet: 1 tab(s) orally once a day (03 Sep 2020 11:32)  Crestor 5 mg oral tablet: 1 tab(s) orally once a day (02 Sep 2020 07:14)  hydroxyurea 500 mg oral capsule: 1 cap(s) orally once a day (03 Sep 2020 11:32)  lisinopril 10 mg oral tablet: 1 tab(s) orally once a day (03 Sep 2020 11:32)  ranolazine 500 mg oral tablet, extended release: 1 tab(s) orally 2 times a day (03 Sep 2020 11:32)  Zetia 10 mg oral tablet: 1 tab(s) orally once a day (02 Sep 2020 07:14)      Allergies:  latex (Swelling)  No Known Drug Allergies      FAMILY HISTORY: not contributory	      SOCIAL HISTORY: denies tobacco / ETOH / illicit drug use    CIGARETTES:  ALCOHOL:        PREVIOUS DIAGNOSTIC TESTING:      ECHO  FINDINGS:  < from: Transthoracic Echocardiogram (20 @ 08:03) >    Summary:   1. Normal global left ventricular systolic function.   2. LV Ejection Fraction by Olson's Method with a biplane EF of 68 %.   3. Spectral Doppler shows impaired relaxation pattern of left ventricular myocardial filling (Grade I diastolic dysfunction).   4. Normal left atrial size.   5. Normal right atrial size.   6. Mild tricuspid regurgitation.   7. SP TAVR 34 mm Medtronic Evolut PRO.      Appropriate postion and expansion. Peak transvalvular velocity1.3 m/s. Peak/mean gradinet 7/4 mmHG. LVOT VTI 21 cm. No paravalvular color doppler jet identified on this study. Echo findings are consistent with normal structure and function of the aortic prosthesis.   8. LA volume Index is 25.0 ml/m² ml/m2.    < end of copied text >    CATHETERIZATION  FINDINGS:  < from: Cardiac Cath Lab - Adult (20 @ 08:36) >  HEMODYNAMICS: Hemodynamic assessment demonstrates normal LVEDP, low cardiac    output, and normal pulmonary capillary wedge pressure. There is mild    pulmonary hypertension.         VENTRICLES: EF calculated by contrast ventriculography was 60 %.         VALVES: AORTIC VALVE: The aortic valve was evaluated by hemodynamic    measurement. There was moderate to severe aortic stenosis.         CORONARY CIRCULATION: The coronary circulation is right dominant. There was    3-vessel coronary artery disease. Left main: The vessel was normal sized.    Angiography showed mild atherosclerosis with no flow limiting lesions.    LAD: The vessel was medium to large sized. Angiography showed moderate    atherosclerosis. Ostial LAD: There was a tubular 40 % stenosis at the site    of a prior stent. There was MASOOD grade 3 flow through the vessel (brisk    flow). Proximal LAD: The vessel was calcified. Angiography showed mild    atherosclerosis with no flow limiting lesions. The stent in proximal LAD    extending into the mid segment of the vessel was patent with evidence of    mild in-stent restenosis. Mid LAD: Angiography showed mild atherosclerosis    with no flow limiting lesions. Distal LAD: Angiography showed minor    luminal irregularities with no flow limiting lesions. 1st diagonal: The    vessel was small to medium sized. It takes off in the middle of the LAD    stent. There was a discrete 75 % stenosis at its ostium.There was MASOOD    grade 3 flow through the vessel (brisk flow). Circumflex: The vessel was    medium to large sized. Angiography showed mild atherosclerosis with no    flow limiting lesions. 1st obtuse marginal: The vessel was medium sized    with high take-off from the proximal circumflex. Angiography showed    moderate atherosclerosis with no clinical lesions appreciated. There was a    discrete 60 % stenosis in its mid segment, at a site with no prior    intervention. There was MASOOD grade 3 flow through the vessel (brisk flow).    2nd obtuse marginal: The vessel wasmedium sized. Angiography showed mild    atherosclerosis with no flow limiting lesions. RCA: The vessel was normal    sized. Angiography showed moderate atherosclerosis. Ostial RCA: There was    a discrete 60 % stenosis at a site with no prior intervention. There was    MASOOD grade 3 flow through the vessel (brisk flow). Proximal RCA: There was    a tubular 50 % stenosis at a site with no prior intervention. Mid RCA:    Angiography showed moderate atherosclerosis with no clinical lesions    appreciated. Distal RCA: Angiography showed mild atherosclerosis with no    flow limiting lesions. Right PDA: The vessel was small sized. Angiography    showed minor luminal irregularities with no flow limiting lesions.         COMPLICATIONS: No complications occurred during the cath lab visit.         IMPRESSIONS: Non-obstructive three-vessel coronary artery disease.      < end of copied text >      CAROTID ULTRASOUND:  FINDINGS  < from: [facility] Duplex Carotid, Bilat (20 @ 08:31) >  IMPRESSION:     High-grade stenosis of the right internal carotid artery. CT angiogram of the neck is recommended for further evaluation.  Vascular surgery consultation is recommended    < end of copied text >        MEDICATIONS  (STANDING):  albuterol/ipratropium for Nebulization 3 milliLiter(s) Nebulizer every 6 hours  aspirin enteric coated 81 milliGRAM(s) Oral daily  atorvastatin 20 milliGRAM(s) Oral at bedtime  chlorhexidine 4% Liquid 1 Application(s) Topical <User Schedule>  clopidogrel Tablet 75 milliGRAM(s) Oral daily  dextrose 50% Injectable 50 milliLiter(s) IV Push every 15 minutes  dextrose 50% Injectable 25 milliLiter(s) IV Push every 15 minutes  hydroxyurea 500 milliGRAM(s) Oral daily  lisinopril 10 milliGRAM(s) Oral daily  magnesium sulfate  IVPB 1 Gram(s) IV Intermittent every 12 hours  ranolazine 500 milliGRAM(s) Oral two times a day    MEDICATIONS  (PRN):  acetaminophen   Tablet .. 650 milliGRAM(s) Oral every 6 hours PRN Temp greater or equal to 38C (100.4F), Mild Pain (1 - 3)  fentaNYL    Injectable 25 MICROGram(s) IV Push once PRN Moderate Pain (4 - 6)  oxyCODONE    IR 5 milliGRAM(s) Oral every 6 hours PRN Moderate Pain (4 - 6)      Vital Signs Last 24 Hrs  T(C): 37.1 (03 Sep 2020 08:00), Max: 37.1 (03 Sep 2020 08:00)  T(F): 98.7 (03 Sep 2020 08:00), Max: 98.7 (03 Sep 2020 08:00)  HR: 101 (03 Sep 2020 10:25) (59 - 101)  BP: 121/59 (03 Sep 2020 10:25) (112/55 - 126/75)  BP(mean): 85 (03 Sep 2020 10:25) (79 - 95)  RR: 33 (03 Sep 2020 10:25) (14 - 33)  SpO2: 99% (03 Sep 2020 10:25) (84% - 100%)    PHYSICAL EXAM:    GENERAL: In no apparent distress, well nourished, and hydrated.  HEAD:  Atraumatic, Normocephalic  EYES: EOMI, PERRLA, conjunctiva and sclera clear  NECK: Supple and normal thyroid.  No JVD or carotid bruit.  Carotid pulse is 2+ bilaterally.  HEART: Regular rate and rhythm; No murmurs, rubs, or gallops.  PULMONARY: Clear to auscultation and perfusion.  No rales, wheezing, or rhonchi bilaterally.  ABDOMEN: Soft, Nontender, Nondistended; Bowel sounds present  EXTREMITIES:  2+ Peripheral Pulses, No clubbing, cyanosis, or edema  NEUROLOGICAL: Grossly nonfocal    I&O's Detail    02 Sep 2020 07:01  -  03 Sep 2020 07:00  --------------------------------------------------------  IN:    insulin regular Infusion: 10 mL    IV PiggyBack: 300 mL    Oral Fluid: 640 mL    sodium chloride 0.9%: 100 mL  Total IN: 1050 mL    OUT:    Incontinent per Condom Catheter: 1670 mL  Total OUT: 1670 mL    Total NET: -620 mL      03 Sep 2020 07:01  -  03 Sep 2020 13:18  --------------------------------------------------------  IN:    Oral Fluid: 240 mL  Total IN: 240 mL    OUT:    Voided: 300 mL  Total OUT: 300 mL    Total NET: -60 mL        Daily     Daily Weight in k.2 (03 Sep 2020 06:00)    INTERPRETATION OF TELEMETRY:    EC Lead ECG:   Ventricular Rate 67 BPM    Atrial Rate 67 BPM    P-R Interval 172 ms    QRS Duration 166 ms    Q-T Interval 470 ms    QTC Calculation(Chaz) 496 ms    P Axis 85 degrees    R Axis -77 degrees    T Axis 70 degrees    Diagnosis Line Atrial-sensed ventricular-paced rhythm  Abnormal ECG    Confirmed by Angel Yao ([id number]) on 9/3/2020 9:17:39 AM (20 @ 08:50)        LABS:                        9.8    3.90  )-----------( 90       ( 03 Sep 2020 03:10 )             27.8     09-03    136  |  104  |  26<H>  ----------------------------<  111<H>  4.4   |  22  |  1.5    Ca    8.2<L>      03 Sep 2020 03:10  Mg     2.2         TPro  5.8<L>  /  Alb  3.6  /  TBili  1.0  /  DBili  x   /  AST  25  /  ALT  9   /  AlkPhos  48  09-03        PT/INR - ( 03 Sep 2020 03:10 )   PT: 11.60 sec;   INR: 1.01 ratio         PTT - ( 03 Sep 2020 03:10 )  PTT:28.9 sec    BNP            RADIOLOGY & ADDITIONAL STUDIES: Patient is a 80y old  Male who presents with a chief complaint of Elective TAVR (03 Sep 2020 08:29)        HPI:  80 yr old male with PMH of HTN, DLD, CKD3, moderate AS, hemochromatosis came here for elective RHC and LHC. Pt c/o significant dyspnea on mild exertion and had abnormal stress test as outpt. Pt presented for cardiac cath which revealed nonobstructive CAD. CT surgery consulted for moderate AS.  The patient had pre-operative work-up for aortic valve replacement and presents today for elective TAVR. (02 Sep 2020 07:36)      Electrophysiology:  80y Male with HTN, HLD, CKD3, hemochromatosis recent abnormal stress test and subsequent R+LHC revealing severe AS, underwent elective TAVR. Intraop patient developed complete heart block and DC PPM ([employer]tr[employer]) was placed at that time  Patient denies recent syncopal episodes dizziness, lightheadedness. In the past ~2016 had syncopal episode and had event monitor at that time that did not reveal any arrhythmias.  Device was interrogated post op, working properly    REVIEW OF SYSTEMS    [x] A ten-point review of systems was otherwise negative except as noted.      PAST MEDICAL & SURGICAL HISTORY:  Aortic stenosis  Hypertension  High cholesterol  Hemochromatosis  Angina at rest  Stented coronary artery  CAD (coronary artery disease)  Ankle injury  Total knee replacement status, bilateral      Home Medications:  acetaminophen 325 mg oral tablet: 2 tab(s) orally every 6 hours, As needed, Temp greater or equal to 38C (100.4F), Mild Pain (1 - 3) (03 Sep 2020 11:32)  aspirin 81 mg oral delayed release tablet: 1 tab(s) orally once a day (03 Sep 2020 11:32)  clopidogrel 75 mg oral tablet: 1 tab(s) orally once a day (03 Sep 2020 11:32)  Crestor 5 mg oral tablet: 1 tab(s) orally once a day (02 Sep 2020 07:14)  hydroxyurea 500 mg oral capsule: 1 cap(s) orally once a day (03 Sep 2020 11:32)  lisinopril 10 mg oral tablet: 1 tab(s) orally once a day (03 Sep 2020 11:32)  ranolazine 500 mg oral tablet, extended release: 1 tab(s) orally 2 times a day (03 Sep 2020 11:32)  Zetia 10 mg oral tablet: 1 tab(s) orally once a day (02 Sep 2020 07:14)      Allergies:  latex (Swelling)  No Known Drug Allergies      FAMILY HISTORY: not contributory	      SOCIAL HISTORY: denies tobacco / ETOH / illicit drug use    CIGARETTES:  ALCOHOL:        PREVIOUS DIAGNOSTIC TESTING:      ECHO  FINDINGS:  < from: Transthoracic Echocardiogram (20 @ 08:03) >    Summary:   1. Normal global left ventricular systolic function.   2. LV Ejection Fraction by Olson's Method with a biplane EF of 68 %.   3. Spectral Doppler shows impaired relaxation pattern of left ventricular myocardial filling (Grade I diastolic dysfunction).   4. Normal left atrial size.   5. Normal right atrial size.   6. Mild tricuspid regurgitation.   7. SP TAVR 34 mm Medtronic Evolut PRO.      Appropriate postion and expansion. Peak transvalvular velocity1.3 m/s. Peak/mean gradinet 7/4 mmHG. LVOT VTI 21 cm. No paravalvular color doppler jet identified on this study. Echo findings are consistent with normal structure and function of the aortic prosthesis.   8. LA volume Index is 25.0 ml/m² ml/m2.    < end of copied text >    CATHETERIZATION  FINDINGS:  < from: Cardiac Cath Lab - Adult (20 @ 08:36) >  HEMODYNAMICS: Hemodynamic assessment demonstrates normal LVEDP, low cardiac    output, and normal pulmonary capillary wedge pressure. There is mild    pulmonary hypertension.         VENTRICLES: EF calculated by contrast ventriculography was 60 %.         VALVES: AORTIC VALVE: The aortic valve was evaluated by hemodynamic    measurement. There was moderate to severe aortic stenosis.         CORONARY CIRCULATION: The coronary circulation is right dominant. There was    3-vessel coronary artery disease. Left main: The vessel was normal sized.    Angiography showed mild atherosclerosis with no flow limiting lesions.    LAD: The vessel was medium to large sized. Angiography showed moderate    atherosclerosis. Ostial LAD: There was a tubular 40 % stenosis at the site    of a prior stent. There was MASOOD grade 3 flow through the vessel (brisk    flow). Proximal LAD: The vessel was calcified. Angiography showed mild    atherosclerosis with no flow limiting lesions. The stent in proximal LAD    extending into the mid segment of the vessel was patent with evidence of    mild in-stent restenosis. Mid LAD: Angiography showed mild atherosclerosis    with no flow limiting lesions. Distal LAD: Angiography showed minor    luminal irregularities with no flow limiting lesions. 1st diagonal: The    vessel was small to medium sized. It takes off in the middle of the LAD    stent. There was a discrete 75 % stenosis at its ostium.There was MASOOD    grade 3 flow through the vessel (brisk flow). Circumflex: The vessel was    medium to large sized. Angiography showed mild atherosclerosis with no    flow limiting lesions. 1st obtuse marginal: The vessel was medium sized    with high take-off from the proximal circumflex. Angiography showed    moderate atherosclerosis with no clinical lesions appreciated. There was a    discrete 60 % stenosis in its mid segment, at a site with no prior    intervention. There was MASOOD grade 3 flow through the vessel (brisk flow).    2nd obtuse marginal: The vessel wasmedium sized. Angiography showed mild    atherosclerosis with no flow limiting lesions. RCA: The vessel was normal    sized. Angiography showed moderate atherosclerosis. Ostial RCA: There was    a discrete 60 % stenosis at a site with no prior intervention. There was    MASOOD grade 3 flow through the vessel (brisk flow). Proximal RCA: There was    a tubular 50 % stenosis at a site with no prior intervention. Mid RCA:    Angiography showed moderate atherosclerosis with no clinical lesions    appreciated. Distal RCA: Angiography showed mild atherosclerosis with no    flow limiting lesions. Right PDA: The vessel was small sized. Angiography    showed minor luminal irregularities with no flow limiting lesions.         COMPLICATIONS: No complications occurred during the cath lab visit.         IMPRESSIONS: Non-obstructive three-vessel coronary artery disease.      < end of copied text >      CAROTID ULTRASOUND:  FINDINGS  < from: [facility] Duplex Carotid, Sandra (20 @ 08:31) >  IMPRESSION:     High-grade stenosis of the right internal carotid artery. CT angiogram of the neck is recommended for further evaluation.  Vascular surgery consultation is recommended    < end of copied text >        MEDICATIONS  (STANDING):  albuterol/ipratropium for Nebulization 3 milliLiter(s) Nebulizer every 6 hours  aspirin enteric coated 81 milliGRAM(s) Oral daily  atorvastatin 20 milliGRAM(s) Oral at bedtime  chlorhexidine 4% Liquid 1 Application(s) Topical <User Schedule>  clopidogrel Tablet 75 milliGRAM(s) Oral daily  dextrose 50% Injectable 50 milliLiter(s) IV Push every 15 minutes  dextrose 50% Injectable 25 milliLiter(s) IV Push every 15 minutes  hydroxyurea 500 milliGRAM(s) Oral daily  lisinopril 10 milliGRAM(s) Oral daily  magnesium sulfate  IVPB 1 Gram(s) IV Intermittent every 12 hours  ranolazine 500 milliGRAM(s) Oral two times a day    MEDICATIONS  (PRN):  acetaminophen   Tablet .. 650 milliGRAM(s) Oral every 6 hours PRN Temp greater or equal to 38C (100.4F), Mild Pain (1 - 3)  fentaNYL    Injectable 25 MICROGram(s) IV Push once PRN Moderate Pain (4 - 6)  oxyCODONE    IR 5 milliGRAM(s) Oral every 6 hours PRN Moderate Pain (4 - 6)      Vital Signs Last 24 Hrs  T(C): 37.1 (03 Sep 2020 08:00), Max: 37.1 (03 Sep 2020 08:00)  T(F): 98.7 (03 Sep 2020 08:00), Max: 98.7 (03 Sep 2020 08:00)  HR: 101 (03 Sep 2020 10:25) (59 - 101)  BP: 121/59 (03 Sep 2020 10:25) (112/55 - 126/75)  BP(mean): 85 (03 Sep 2020 10:25) (79 - 95)  RR: 33 (03 Sep 2020 10:25) (14 - 33)  SpO2: 99% (03 Sep 2020 10:25) (84% - 100%)    PHYSICAL EXAM:    GENERAL: In no apparent distress, well nourished, and hydrated.  HEAD:  Atraumatic, Normocephalic  EYES: EOMI, PERRLA, conjunctiva and sclera clear  NECK: Supple and normal thyroid.  No JVD or carotid bruit.  Carotid pulse is 2+ bilaterally.  HEART: Regular rate and rhythm; No murmurs, rubs, or gallops.  PULMONARY: Clear to auscultation and perfusion.  No rales, wheezing, or rhonchi bilaterally.  ABDOMEN: Soft, Nontender, Nondistended; Bowel sounds present  EXTREMITIES:  2+ Peripheral Pulses, No clubbing, cyanosis, or edema  NEUROLOGICAL: Grossly nonfocal    I&O's Detail    02 Sep 2020 07:01  -  03 Sep 2020 07:00  --------------------------------------------------------  IN:    insulin regular Infusion: 10 mL    IV PiggyBack: 300 mL    Oral Fluid: 640 mL    sodium chloride 0.9%: 100 mL  Total IN: 1050 mL    OUT:    Incontinent per Condom Catheter: 1670 mL  Total OUT: 1670 mL    Total NET: -620 mL      03 Sep 2020 07:01  -  03 Sep 2020 13:18  --------------------------------------------------------  IN:    Oral Fluid: 240 mL  Total IN: 240 mL    OUT:    Voided: 300 mL  Total OUT: 300 mL    Total NET: -60 mL        Daily     Daily Weight in k.2 (03 Sep 2020 06:00)    INTERPRETATION OF TELEMETRY:    EC Lead ECG:   Ventricular Rate 67 BPM    Atrial Rate 67 BPM    P-R Interval 172 ms    QRS Duration 166 ms    Q-T Interval 470 ms    QTC Calculation(Chaz) 496 ms    P Axis 85 degrees    R Axis -77 degrees    T Axis 70 degrees    Diagnosis Line Atrial-sensed ventricular-paced rhythm  Abnormal ECG    Confirmed by Angel Yao ([id number]) on 9/3/2020 9:17:39 AM (20 @ 08:50)        LABS:                        9.8    3.90  )-----------( 90       ( 03 Sep 2020 03:10 )             27.8     09-03    136  |  104  |  26<H>  ----------------------------<  111<H>  4.4   |  22  |  1.5    Ca    8.2<L>      03 Sep 2020 03:10  Mg     2.2     03    TPro  5.8<L>  /  Alb  3.6  /  TBili  1.0  /  DBili  x   /  AST  25  /  ALT  9   /  AlkPhos  48  09-03        PT/INR - ( 03 Sep 2020 03:10 )   PT: 11.60 sec;   INR: 1.01 ratio         PTT - ( 03 Sep 2020 03:10 )  PTT:28.9 sec    BNP            RADIOLOGY & ADDITIONAL STUDIES:

## 2020-09-03 NOTE — DISCHARGE NOTE NURSING/CASE MANAGEMENT/SOCIAL WORK - NSDCFUADDAPPT_GEN_ALL_CORE_FT
Follow up at [facility] on September 11  at 2:10pm.  Please also follow up with cardiology in 2 weeks and with pmd in 2-4 weeks.    Pt also needs to follow up with EP in 4 weeks for device check

## 2020-09-03 NOTE — PROGRESS NOTE ADULT - SUBJECTIVE AND OBJECTIVE BOX
OPERATIVE PROCEDURE(s):                POD #    SURGEON(s):      SUBJECTIVE ASSESSMENT:    Vital Signs Last 24 Hrs  T(C): 36.7 (03 Sep 2020 04:00), Max: 36.8 (03 Sep 2020 00:00)  T(F): 98.1 (03 Sep 2020 04:00), Max: 98.2 (03 Sep 2020 00:00)  HR: 63 (03 Sep 2020 07:00) (59 - 65)  BP: 126/75 (03 Sep 2020 06:00) (112/55 - 127/60)  BP(mean): 95 (03 Sep 2020 06:00) (79 - 95)  RR: 24 (03 Sep 2020 07:00) (14 - 26)  SpO2: 98% (03 Sep 2020 07:00) (84% - 100%)  09-02-20 @ 07:01  -  09-03-20 @ 07:00  --------------------------------------------------------  IN: 1050 mL / OUT: 1670 mL / NET: -620 mL    09-03-20 @ 07:01  -  09-03-20 @ 08:30  --------------------------------------------------------  IN: 120 mL / OUT: 0 mL / NET: 120 mL        Physical Exam  General:  Chest:  CVS:  Abd:   GI/ :  Ext:    Central Venous Catheter: Yes[ ]  No[ ] , If Yes indication:           Day #    Payan Catheter: Yes  [ ] , No [ ] : If yes indication:                         Day #    NGT: Yes [ ] No [  ]     If Yes Placement:                                          Day #    Post-Op Beta-Blockers: Yes [ ], No[ ], If No, then contraindication:    CHEST TUBE:  [ ] YES [ ] NO  OUTPUT:     per 24 hours    AIR LEAKS:  [ ] YES [ ] NO      EPICARDIAL WIRES:  [ ] YES [ ] NO      BOWEL MOVEMENT:  [ ] YES [ ] NO          LABS:                        9.8    3.90  )-----------( 90       ( 03 Sep 2020 03:10 )             27.8     COUMADIN:   [ ] YES [ ] NO    PT/INR - ( 03 Sep 2020 03:10 )   PT: 11.60 sec;   INR: 1.01 ratio         PTT - ( 03 Sep 2020 03:10 )  PTT:28.9 sec  09-03    136  |  104  |  26<H>  ----------------------------<  111<H>  4.4   |  22  |  1.5    Ca    8.2<L>      03 Sep 2020 03:10  Mg     2.2     09-03    TPro  5.8<L>  /  Alb  3.6  /  TBili  1.0  /  DBili  x   /  AST  25  /  ALT  9   /  AlkPhos  48  09-03        MEDICATIONS  (STANDING):  albuterol/ipratropium for Nebulization 3 milliLiter(s) Nebulizer every 6 hours  aspirin enteric coated 81 milliGRAM(s) Oral daily  atorvastatin 20 milliGRAM(s) Oral at bedtime  chlorhexidine 4% Liquid 1 Application(s) Topical <User Schedule>  clopidogrel Tablet 75 milliGRAM(s) Oral daily  dextrose 50% Injectable 50 milliLiter(s) IV Push every 15 minutes  dextrose 50% Injectable 25 milliLiter(s) IV Push every 15 minutes  hydroxyurea 500 milliGRAM(s) Oral daily  insulin regular Infusion 1 Unit(s)/Hr (1 mL/Hr) IV Continuous <Continuous>  lisinopril 10 milliGRAM(s) Oral daily  magnesium sulfate  IVPB 1 Gram(s) IV Intermittent every 12 hours  niCARdipine Infusion 5 mG/Hr (25 mL/Hr) IV Continuous <Continuous>  nitroglycerin  Infusion 5 MICROgram(s)/Min (1.5 mL/Hr) IV Continuous <Continuous>  norepinephrine Infusion 0.05 MICROgram(s)/kG/Min (9.28 mL/Hr) IV Continuous <Continuous>  ranolazine 1000 milliGRAM(s) Oral two times a day  sodium chloride 0.9%. 1000 milliLiter(s) (20 mL/Hr) IV Continuous <Continuous>    MEDICATIONS  (PRN):  acetaminophen   Tablet .. 650 milliGRAM(s) Oral every 6 hours PRN Temp greater or equal to 38C (100.4F), Mild Pain (1 - 3)  fentaNYL    Injectable 25 MICROGram(s) IV Push once PRN Moderate Pain (4 - 6)  oxyCODONE    IR 5 milliGRAM(s) Oral every 6 hours PRN Moderate Pain (4 - 6)      Allergies    latex (Swelling)  No Known Drug Allergies    Intolerances        Ambulation/Activity Status:        RADIOLOGY & ADDITIONAL TESTS:        Assessment/Plan:    Social Service Disposition: OPERATIVE PROCEDURE(s):  tavr/ppm              POD # 1    SURGEON(s):     SUBJECTIVE ASSESSMENT: pt has no complaints and is eager to go home and refuses home care    Vital Signs Last 24 Hrs  T(C): 36.7 (03 Sep 2020 04:00), Max: 36.8 (03 Sep 2020 00:00)  T(F): 98.1 (03 Sep 2020 04:00), Max: 98.2 (03 Sep 2020 00:00)  HR: 63 (03 Sep 2020 07:00) (59 - 65)  BP: 126/75 (03 Sep 2020 06:00) (112/55 - 127/60)  BP(mean): 95 (03 Sep 2020 06:00) (79 - 95)  RR: 24 (03 Sep 2020 07:00) (14 - 26)  SpO2: 98% (03 Sep 2020 07:00) (84% - 100%)  09-02-20 @ 07:01  -  09-03-20 @ 07:00  --------------------------------------------------------  IN: 1050 mL / OUT: 1670 mL / NET: -620 mL    09-03-20 @ 07:01  -  09-03-20 @ 08:30  --------------------------------------------------------  IN: 120 mL / OUT: 0 mL / NET: 120 mL        Physical Exam  General: alert and oriented x 3  Chest: cta bl  CVS: s1s2  Abd: pos bs soft nt  GI/ :  Ext: b/l groin incisions are dressed; no obvious hematoma  left anterior chest incision- c/d/i with occlusive dressing in place; no obvious hematoma    Central Venous Catheter: Yes[ ]  No[x ] , If Yes indication:           Day #    Payan Catheter: Yes  [ ] , No [x ] : If yes indication:                         Day #    NGT: Yes [ ] No [x  ]     If Yes Placement:                                          Day #    Post-Op Beta-Blockers: Yes [ ], No[ x], If No, then contraindication: bradycardia    CHEST TUBE:  [ ] YES [ x] NO  OUTPUT:     per 24 hours    AIR LEAKS:  [ ] YES [ ] NO      EPICARDIAL WIRES:  [ ] YES [x ] NO      BOWEL MOVEMENT:  [ ] YES [ x] NO          LABS:                        9.8    3.90  )-----------( 90       ( 03 Sep 2020 03:10 )             27.8     COUMADIN:   [ ] YES [ ] NO    PT/INR - ( 03 Sep 2020 03:10 )   PT: 11.60 sec;   INR: 1.01 ratio         PTT - ( 03 Sep 2020 03:10 )  PTT:28.9 sec  09-03    136  |  104  |  26<H>  ----------------------------<  111<H>  4.4   |  22  |  1.5    Ca    8.2<L>      03 Sep 2020 03:10  Mg     2.2     09-03    TPro  5.8<L>  /  Alb  3.6  /  TBili  1.0  /  DBili  x   /  AST  25  /  ALT  9   /  AlkPhos  48  09-03    MEDICATIONS  (STANDING):  albuterol/ipratropium for Nebulization 3 milliLiter(s) Nebulizer every 6 hours  aspirin enteric coated 81 milliGRAM(s) Oral daily  atorvastatin 20 milliGRAM(s) Oral at bedtime  chlorhexidine 4% Liquid 1 Application(s) Topical <User Schedule>  clopidogrel Tablet 75 milliGRAM(s) Oral daily  dextrose 50% Injectable 50 milliLiter(s) IV Push every 15 minutes  dextrose 50% Injectable 25 milliLiter(s) IV Push every 15 minutes  hydroxyurea 500 milliGRAM(s) Oral daily  insulin regular Infusion 1 Unit(s)/Hr (1 mL/Hr) IV Continuous <Continuous>  lisinopril 10 milliGRAM(s) Oral daily  magnesium sulfate  IVPB 1 Gram(s) IV Intermittent every 12 hours  niCARdipine Infusion 5 mG/Hr (25 mL/Hr) IV Continuous <Continuous>  nitroglycerin  Infusion 5 MICROgram(s)/Min (1.5 mL/Hr) IV Continuous <Continuous>  norepinephrine Infusion 0.05 MICROgram(s)/kG/Min (9.28 mL/Hr) IV Continuous <Continuous>  ranolazine 1000 milliGRAM(s) Oral two times a day  sodium chloride 0.9%. 1000 milliLiter(s) (20 mL/Hr) IV Continuous <Continuous>    MEDICATIONS  (PRN):  acetaminophen   Tablet .. 650 milliGRAM(s) Oral every 6 hours PRN Temp greater or equal to 38C (100.4F), Mild Pain (1 - 3)  fentaNYL    Injectable 25 MICROGram(s) IV Push once PRN Moderate Pain (4 - 6)  oxyCODONE    IR 5 milliGRAM(s) Oral every 6 hours PRN Moderate Pain (4 - 6)    Allergies    latex (Swelling)  No Known Drug Allergies    Intolerances    Ambulation/Activity Status:    ambulates without assistance     RADIOLOGY & ADDITIONAL TESTS:    EXAM:  XR CHEST PORTABLE IMMED 1V            PROCEDURE DATE:  09/02/2020        INTERPRETATION:  Clinical History / Reason for exam: Aortic valvular disease.    Comparison : Chest radiograph earlier the same day.    Technique/Positioning: Frontal portable.    Findings:    Support devices: Telemetry leads overlie the thorax. Dual-chamber left-sided pacer. Right-sided central venous catheter.    Cardiac/mediastinum/hilum: Unremarkable.    Lung parenchyma/Pleura: Within normal limits.    Skeleton/soft tissues: Unremarkable.    Impression:    No radiographic evidence of acute cardiopulmonary disease.    Support devices as described.    Assessment/Plan: Patient is a 79 yo male s/p tavr / ppm pod # 1  cont present tx as per ct surgeon  s/p tavr - cont asa and plavix  s/p ppm- awaiting for device to be interrogated   d/c home  cont hydroxyurea for hemochromatosis OPERATIVE PROCEDURE(s):  tavr/ppm              POD # 1    SURGEON(s):     SUBJECTIVE ASSESSMENT: pt has no complaints and is eager to go home and refuses home care    Vital Signs Last 24 Hrs  T(C): 36.7 (03 Sep 2020 04:00), Max: 36.8 (03 Sep 2020 00:00)  T(F): 98.1 (03 Sep 2020 04:00), Max: 98.2 (03 Sep 2020 00:00)  HR: 63 (03 Sep 2020 07:00) (59 - 65)  BP: 126/75 (03 Sep 2020 06:00) (112/55 - 127/60)  BP(mean): 95 (03 Sep 2020 06:00) (79 - 95)  RR: 24 (03 Sep 2020 07:00) (14 - 26)  SpO2: 98% (03 Sep 2020 07:00) (84% - 100%)  09-02-20 @ 07:01  -  09-03-20 @ 07:00  --------------------------------------------------------  IN: 1050 mL / OUT: 1670 mL / NET: -620 mL    09-03-20 @ 07:01  -  09-03-20 @ 08:30  --------------------------------------------------------  IN: 120 mL / OUT: 0 mL / NET: 120 mL        Physical Exam  General: alert and oriented x 3  Chest: cta bl  CVS: s1s2  Abd: pos bs soft nt  GI/ :  Ext: b/l groin incisions are dressed; no obvious hematoma  left anterior chest incision- c/d/i with occlusive dressing in place; no obvious hematoma    Central Venous Catheter: Yes[ ]  No[x ] , If Yes indication:           Day #    Payan Catheter: Yes  [ ] , No [x ] : If yes indication:                         Day #    NGT: Yes [ ] No [x  ]     If Yes Placement:                                          Day #    Post-Op Beta-Blockers: Yes [ ], No[ x], If No, then contraindication: bradycardia    CHEST TUBE:  [ ] YES [ x] NO  OUTPUT:     per 24 hours    AIR LEAKS:  [ ] YES [ ] NO      EPICARDIAL WIRES:  [ ] YES [x ] NO      BOWEL MOVEMENT:  [ ] YES [ x] NO          LABS:                        9.8    3.90  )-----------( 90       ( 03 Sep 2020 03:10 )             27.8     COUMADIN:   [ ] YES [ x] NO    PT/INR - ( 03 Sep 2020 03:10 )   PT: 11.60 sec;   INR: 1.01 ratio         PTT - ( 03 Sep 2020 03:10 )  PTT:28.9 sec  09-03    136  |  104  |  26<H>  ----------------------------<  111<H>  4.4   |  22  |  1.5    Ca    8.2<L>      03 Sep 2020 03:10  Mg     2.2     09-03    TPro  5.8<L>  /  Alb  3.6  /  TBili  1.0  /  DBili  x   /  AST  25  /  ALT  9   /  AlkPhos  48  09-03    MEDICATIONS  (STANDING):  albuterol/ipratropium for Nebulization 3 milliLiter(s) Nebulizer every 6 hours  aspirin enteric coated 81 milliGRAM(s) Oral daily  atorvastatin 20 milliGRAM(s) Oral at bedtime  chlorhexidine 4% Liquid 1 Application(s) Topical <User Schedule>  clopidogrel Tablet 75 milliGRAM(s) Oral daily  dextrose 50% Injectable 50 milliLiter(s) IV Push every 15 minutes  dextrose 50% Injectable 25 milliLiter(s) IV Push every 15 minutes  hydroxyurea 500 milliGRAM(s) Oral daily  insulin regular Infusion 1 Unit(s)/Hr (1 mL/Hr) IV Continuous <Continuous>  lisinopril 10 milliGRAM(s) Oral daily  magnesium sulfate  IVPB 1 Gram(s) IV Intermittent every 12 hours  ranolazine 500 milliGRAM(s) Oral two times a day  sodium chloride 0.9%. 1000 milliLiter(s) (20 mL/Hr) IV Continuous <Continuous>    MEDICATIONS  (PRN):  acetaminophen   Tablet .. 650 milliGRAM(s) Oral every 6 hours PRN Temp greater or equal to 38C (100.4F), Mild Pain (1 - 3)  fentaNYL    Injectable 25 MICROGram(s) IV Push once PRN Moderate Pain (4 - 6)  oxyCODONE    IR 5 milliGRAM(s) Oral every 6 hours PRN Moderate Pain (4 - 6)    Allergies    latex (Swelling)  No Known Drug Allergies    Intolerances    Ambulation/Activity Status:    ambulates without assistance     RADIOLOGY & ADDITIONAL TESTS:    EXAM:  XR CHEST PORTABLE IMMED 1V            PROCEDURE DATE:  09/02/2020        INTERPRETATION:  Clinical History / Reason for exam: Aortic valvular disease.    Comparison : Chest radiograph earlier the same day.    Technique/Positioning: Frontal portable.    Findings:    Support devices: Telemetry leads overlie the thorax. Dual-chamber left-sided pacer. Right-sided central venous catheter.    Cardiac/mediastinum/hilum: Unremarkable.    Lung parenchyma/Pleura: Within normal limits.    Skeleton/soft tissues: Unremarkable.    Impression:    No radiographic evidence of acute cardiopulmonary disease.    Support devices as described.    2d echo -done and reviewed by dr sim who does not report any acute findings    Assessment/Plan: Patient is a 79 yo male s/p tavr / ppm pod # 1  cont present tx as per ct surgeon  s/p tavr - cont asa and plavix  s/p ppm- awaiting for device to be interrogated   d/c home  cont hydroxyurea for hemochromatosis

## 2020-09-03 NOTE — DISCHARGE NOTE NURSING/CASE MANAGEMENT/SOCIAL WORK - PATIENT PORTAL LINK FT
You can access the FollowMyHealth Patient Portal offered by [facility] by registering at the following website: [email or web address]. By joining [facility]’s FollowMyHealth portal, you will also be able to view your health information using other applications (apps) compatible with our system.

## 2020-09-04 ENCOUNTER — TRANSCRIPTION ENCOUNTER (OUTPATIENT)
Age: 80
End: 2020-09-04

## 2020-09-06 ENCOUNTER — TRANSCRIPTION ENCOUNTER (OUTPATIENT)
Age: 80
End: 2020-09-06

## 2020-09-09 DIAGNOSIS — I45.2 BIFASCICULAR BLOCK: ICD-10-CM

## 2020-09-09 DIAGNOSIS — Z95.5 PRESENCE OF CORONARY ANGIOPLASTY IMPLANT AND GRAFT: ICD-10-CM

## 2020-09-09 DIAGNOSIS — E78.5 HYPERLIPIDEMIA, UNSPECIFIED: ICD-10-CM
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
## 2020-09-09 DIAGNOSIS — Z96.653 PRESENCE OF ARTIFICIAL KNEE JOINT, BILATERAL: ICD-10-CM

## 2020-09-09 DIAGNOSIS — I65.21 OCCLUSION AND STENOSIS OF RIGHT CAROTID ARTERY: ICD-10-CM

## 2020-09-09 DIAGNOSIS — I44.2 ATRIOVENTRICULAR BLOCK, COMPLETE: ICD-10-CM

## 2020-09-09 DIAGNOSIS — I49.5 SICK SINUS SYNDROME: ICD-10-CM

## 2020-09-09 DIAGNOSIS — I35.2 NONRHEUMATIC AORTIC (VALVE) STENOSIS WITH INSUFFICIENCY: ICD-10-CM

## 2020-09-09 DIAGNOSIS — R00.1 BRADYCARDIA, UNSPECIFIED: ICD-10-CM

## 2020-09-09 DIAGNOSIS — I13.0 HYPERTENSIVE HEART AND CHRONIC KIDNEY DISEASE WITH HEART FAILURE AND STAGE 1 THROUGH STAGE 4 CHRONIC KIDNEY DISEASE, OR UNSPECIFIED CHRONIC KIDNEY DISEASE: ICD-10-CM

## 2020-09-09 DIAGNOSIS — N18.3 CHRONIC KIDNEY DISEASE, STAGE 3 (MODERATE): ICD-10-CM

## 2020-09-09 DIAGNOSIS — M19.90 UNSPECIFIED OSTEOARTHRITIS, UNSPECIFIED SITE: ICD-10-CM
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
## 2020-09-09 DIAGNOSIS — I73.9 PERIPHERAL VASCULAR DISEASE, UNSPECIFIED: ICD-10-CM

## 2020-09-09 DIAGNOSIS — I50.32 CHRONIC DIASTOLIC (CONGESTIVE) HEART FAILURE: ICD-10-CM

## 2020-09-09 DIAGNOSIS — D47.3 ESSENTIAL (HEMORRHAGIC) THROMBOCYTHEMIA: ICD-10-CM

## 2020-09-09 DIAGNOSIS — E83.119 HEMOCHROMATOSIS, UNSPECIFIED: ICD-10-CM

## 2020-09-09 DIAGNOSIS — I25.10 ATHEROSCLEROTIC HEART DISEASE OF NATIVE CORONARY ARTERY WITHOUT ANGINA PECTORIS: ICD-10-CM

## 2020-09-09 DIAGNOSIS — I97.790 OTHER INTRAOPERATIVE CARDIAC FUNCTIONAL DISTURBANCES DURING CARDIAC SURGERY: ICD-10-CM

## 2020-09-09 DIAGNOSIS — T82.03XA LEAKAGE OF HEART VALVE PROSTHESIS, INITIAL ENCOUNTER: ICD-10-CM

## 2020-09-11 ENCOUNTER — APPOINTMENT (OUTPATIENT)
Dept: CARDIOLOGY | Facility: CLINIC | Age: 80
End: 2020-09-11
Payer: COMMERCIAL

## 2020-09-11 VITALS
BODY MASS INDEX: 25.15 KG/M2 | SYSTOLIC BLOOD PRESSURE: 143 MMHG | HEART RATE: 72 BPM | HEIGHT: 74 IN | DIASTOLIC BLOOD PRESSURE: 77 MMHG | TEMPERATURE: 97.5 F | OXYGEN SATURATION: 91 % | RESPIRATION RATE: 19 BRPM | WEIGHT: 196 LBS

## 2020-09-11 PROCEDURE — 93000 ELECTROCARDIOGRAM COMPLETE: CPT

## 2020-09-11 PROCEDURE — 93306 TTE W/DOPPLER COMPLETE: CPT

## 2020-09-11 NOTE — PHYSICAL EXAM
[General Appearance - Well Developed] : well developed [] : no respiratory distress [Heart Rate And Rhythm] : heart rate and rhythm were normal [Bowel Sounds] : normal bowel sounds [Abnormal Walk] : normal gait [Nail Clubbing] : no clubbing of the fingernails [Skin Color & Pigmentation] : normal skin color and pigmentation [Oriented To Time, Place, And Person] : oriented to person, place, and time

## 2020-09-11 NOTE — ASSESSMENT
[FreeTextEntry1] : Mr. MARIA LUISA COATS is a 80 year M that arrives today for a post op visit. Patient is status post TAVR on 9/2/20 via transfemoral approach. Patient arrives to the office for their first post op visit. On arrival patient denies fever, chills nausea, and vomiting. Denies SOB or palpitation. Patient states that they feel tired and not making improvement. Echo stat, shows no pericardial effusion. Plan will be to f/u with EP next week to evaluate PPM settings. \par \par

## 2020-09-11 NOTE — REASON FOR VISIT
[Follow-Up - Clinic] : a clinic follow-up of [FreeTextEntry2] : s/p TAVR [FreeTextEntry1] : MR. Nielsen is a 81 y/o male with PMH of HTN, CAD (s/p PCI), DLD, CKD3, Right ICA stenosis, moderate AS, hemochromatosis had recent RHC and LHC (non occlusive cad) as part of TAVR work-up. Pt c/o significant dyspnea on mild exertion and had abnormal stress test as outpt. Pt presents today TAVR after he was found to have severe aortic stenosis. Intra-op, the patient developed complete heart block and had a PPM inserted intra-operatively.  His post-op course was uneventful and he was discharged home in stable condition on POD #1. He states he has not made a significant improvement. NYHA class III

## 2020-09-14 ENCOUNTER — RESULT CHARGE (OUTPATIENT)
Age: 80
End: 2020-09-14

## 2020-10-05 ENCOUNTER — TRANSCRIPTION ENCOUNTER (OUTPATIENT)
Age: 80
End: 2020-10-05

## 2020-10-06 ENCOUNTER — APPOINTMENT (OUTPATIENT)
Dept: CARDIOLOGY | Facility: CLINIC | Age: 80
End: 2020-10-06
Payer: COMMERCIAL

## 2020-10-06 ENCOUNTER — NON-APPOINTMENT (OUTPATIENT)
Age: 80
End: 2020-10-06

## 2020-10-06 VITALS
WEIGHT: 193 LBS | HEART RATE: 72 BPM | BODY MASS INDEX: 24.77 KG/M2 | HEIGHT: 74 IN | TEMPERATURE: 97.1 F | DIASTOLIC BLOOD PRESSURE: 75 MMHG | SYSTOLIC BLOOD PRESSURE: 148 MMHG

## 2020-10-06 DIAGNOSIS — Z86.79 PERSONAL HISTORY OF OTHER DISEASES OF THE CIRCULATORY SYSTEM: ICD-10-CM

## 2020-10-06 DIAGNOSIS — Z45.018 ENCOUNTER FOR ADJUSTMENT AND MANAGEMENT OF OTHER PART OF CARDIAC PACEMAKER: ICD-10-CM

## 2020-10-06 PROCEDURE — 99214 OFFICE O/P EST MOD 30 MIN: CPT | Mod: 25

## 2020-10-06 PROCEDURE — 93280 PM DEVICE PROGR EVAL DUAL: CPT

## 2020-10-06 RX ORDER — OXYCODONE HYDROCHLORIDE 5 MG/1
5 TABLET ORAL
Refills: 0 | Status: COMPLETED | COMMUNITY
Start: 2020-09-08 | End: 2020-10-06

## 2020-10-06 RX ORDER — EZETIMIBE 10 MG/1
10 TABLET ORAL DAILY
Refills: 0 | Status: COMPLETED | COMMUNITY
Start: 2020-09-08 | End: 2020-10-06

## 2020-10-06 RX ORDER — ACETAMINOPHEN 325 MG/1
325 TABLET ORAL
Refills: 0 | Status: COMPLETED | COMMUNITY
Start: 2020-09-08 | End: 2020-10-06

## 2020-10-06 NOTE — HISTORY OF PRESENT ILLNESS
[de-identified] : 80 years old with PMH HTN, DLD, CKD3, moderate AS, hematochromatosis, s/p TAVR  on 9/2/2020. Developed complete heart block  after procedure.

## 2020-10-06 NOTE — REASON FOR VISIT
[Follow-up Device Check] : follow-up device check visit [FreeTextEntry1] : Cardiologist: Dr. Cason\par \par MR. Nielsen is a 79 y/o male with PMH of HTN, CAD (s/p PCI), DLD, CKD3, Right ICA stenosis, severe AS, hemochromatosis had recent RHC and LHC (non occlusive cad) as part of TAVR work-up. Pt c/o significant dyspnea on mild exertion and had abnormal stress test as outpt. S/p TAVR on 9/2 Intra-op, the patient developed complete heart block and had a PPM inserted intra-operatively.  \par \par Presents today for post implant interrogation and incision check\par \par Denies any sob, PND, orthopnea, no palpitations, no dizziness,lightheadedness, denies chest pains, but he admits that he remains sob walking on an incline/walking up the stairs.\par \par ECG ( 10.6/2020) 59bpm  V paced \par ECHO ( 9/11/2020) EF 60-65% trivial para valvular regurg\par cardiac cath ( 8/14/2020)  non obstructive 3 vessel CAD

## 2020-10-06 NOTE — ASSESSMENT
[FreeTextEntry1] : \par Received a dual chamber PPM intra op secondary to CHB during TAVR 9/2/2020\par Device interrogation showed stable parameter\par Rate histogram was reviewed- appropriate response to activity\par patient was made to walk in the hallway  as well as on the stairwell -  no problem walking on flat surface.\par HR increased to 88 -100bpm, patient had dyspnea going up the stairs.\par No reprogramming change.\par c/w with current medications\par \par Enrolled on remote monitoring\par Remote monitoring was discussed with patient, schedule, process,  as well as associated co-pay that may not covered by their insurance.\par \par \par RTO in 3 mos with Dr. Landis

## 2020-10-06 NOTE — PROCEDURE
[No] : not [NSR] : normal sinus rhythm [Pacemaker] : pacemaker [DDD] : DDD [Longevity: ___ months] : The estimated remaining battery life is [unfilled] months [Threshold Testing Performed] : Threshold testing was performed [Atrial] : Atrial [Ventricular] : Ventricular [Lead Imp:  ___ohms] : lead impedance was [unfilled] ohms [Sensing Amplitude ___mv] : sensing amplitude was [unfilled] mv [___V @] : [unfilled] V [___ ms] : [unfilled] ms [Programmed for Longevity] : output reprogrammed for improved battery longevity [de-identified] : Medtronic Cathie XT DR GIL [de-identified] : [id number] [de-identified] : [id number] [de-identified] : 09/02/2020 [de-identified] : 60/130 [de-identified] : MVP off\par  98.8 %\par AP 57.7%

## 2020-10-07 ENCOUNTER — APPOINTMENT (OUTPATIENT)
Dept: CARDIOLOGY | Facility: CLINIC | Age: 80
End: 2020-10-07

## 2020-10-16 ENCOUNTER — APPOINTMENT (OUTPATIENT)
Dept: CARDIOLOGY | Facility: CLINIC | Age: 80
End: 2020-10-16
Payer: COMMERCIAL

## 2020-10-16 ENCOUNTER — APPOINTMENT (OUTPATIENT)
Dept: CARDIOLOGY | Facility: CLINIC | Age: 80
End: 2020-10-16

## 2020-10-16 PROCEDURE — 93306 TTE W/DOPPLER COMPLETE: CPT

## 2020-10-16 PROCEDURE — 93000 ELECTROCARDIOGRAM COMPLETE: CPT

## 2020-10-22 ENCOUNTER — APPOINTMENT (OUTPATIENT)
Dept: CARDIOLOGY | Facility: CLINIC | Age: 80
End: 2020-10-22
Payer: COMMERCIAL

## 2020-10-22 DIAGNOSIS — I35.0 NONRHEUMATIC AORTIC (VALVE) STENOSIS: ICD-10-CM

## 2020-10-22 PROCEDURE — 99442: CPT | Mod: GT

## 2020-10-26 PROBLEM — I35.0 AORTIC STENOSIS, SEVERE: Status: RESOLVED | Noted: 2020-08-21 | Resolved: 2020-10-26

## 2020-10-26 PROBLEM — I35.0 AORTIC STENOSIS, SEVERE: Status: RESOLVED | Noted: 2020-08-31 | Resolved: 2020-10-26

## 2020-10-26 NOTE — REASON FOR VISIT
[Follow-Up - Clinic] : a clinic follow-up of [Aortic Stenosis] : aortic stenosis [FreeTextEntry1] : The patient, Sebastian Nielsen, and I participated in a Telehealth encounter (phone).\par Patient provided verbal consent.\par \par Still has exertional dyspnea.\par \par ECHO Reviewed: nL LVSF.  nL THV function.\par \par Saw EP.   99%.  Settings apparently optimized.\par \par Will discuss with EP / Dr. Cason.  May benefit from PCI if anginal equivalent.  Unsure if CRT an option despite nL LVSF.\par \par 15-minute conversation.

## 2020-11-22 ENCOUNTER — RESULT CHARGE (OUTPATIENT)
Age: 80
End: 2020-11-22

## 2020-12-03 ENCOUNTER — OUTPATIENT (OUTPATIENT)
Dept: OUTPATIENT SERVICES | Facility: HOSPITAL | Age: 80
LOS: 1 days | Discharge: HOME | End: 2020-12-03

## 2020-12-03 ENCOUNTER — APPOINTMENT (OUTPATIENT)
Dept: HEMATOLOGY ONCOLOGY | Facility: CLINIC | Age: 80
End: 2020-12-03
Payer: COMMERCIAL

## 2020-12-03 ENCOUNTER — LABORATORY RESULT (OUTPATIENT)
Age: 80
End: 2020-12-03

## 2020-12-03 VITALS
HEIGHT: 74 IN | BODY MASS INDEX: 25.67 KG/M2 | TEMPERATURE: 96.9 F | RESPIRATION RATE: 18 BRPM | SYSTOLIC BLOOD PRESSURE: 110 MMHG | HEART RATE: 67 BPM | DIASTOLIC BLOOD PRESSURE: 79 MMHG | WEIGHT: 200 LBS

## 2020-12-03 DIAGNOSIS — Z96.653 PRESENCE OF ARTIFICIAL KNEE JOINT, BILATERAL: Chronic | ICD-10-CM

## 2020-12-03 DIAGNOSIS — S99.919A UNSPECIFIED INJURY OF UNSPECIFIED ANKLE, INITIAL ENCOUNTER: Chronic | ICD-10-CM

## 2020-12-03 LAB
HCT VFR BLD CALC: 37.6 %
HGB BLD-MCNC: 13.4 G/DL
MCHC RBC-ENTMCNC: 35.6 G/DL
MCHC RBC-ENTMCNC: 47.5 PG
MCV RBC AUTO: 133.3 FL
PLATELET # BLD AUTO: 324 K/UL
PMV BLD: 10.2 FL
RBC # BLD: 2.82 M/UL
RBC # FLD: 11.4 %
WBC # FLD AUTO: 4.61 K/UL

## 2020-12-03 PROCEDURE — 99213 OFFICE O/P EST LOW 20 MIN: CPT

## 2020-12-04 LAB — FERRITIN SERPL-MCNC: 247 NG/ML

## 2020-12-07 NOTE — ASSESSMENT
[FreeTextEntry1] : Essential thrombocythemia controlled with hydrea, hereditary hemochromatosis  on periodic phlebotomy. \par \par PLAN:\par -- CBC reviewed today and stable. Continue same dose of Hydrea 1000 mg alternating with 1500 mg daily. \par -- Repeat ferritin. Based on ferritin will decide if he needs phlebotomy or not. \par -- follow up with CTS for TAVR\par -- Follow up with EPS for management of PPM and SOB\par -- Return in 6 months. \par \par Patient seen and examined with Dr. Herrera

## 2020-12-07 NOTE — REVIEW OF SYSTEMS
[SOB on Exertion] : shortness of breath during exertion [Negative] : Genitourinary [FreeTextEntry5] : PPM

## 2020-12-07 NOTE — HISTORY OF PRESENT ILLNESS
[de-identified] : \par 76/M presenting for follow-up for essential thrombocythemia and hemochromatosis. He is on Hydrea 1500 mg daily, tolerating well. His last phlebotomy was in March 2017. \par He has History of DJD s/p knee arthroplasty , chronic shoulder pain, CAD , aortic stenosis , CHF , positional vertigo worse when turning in bed. hypertension, regimen changed in May2017 .  [de-identified] : The patient returns for follow-up , he complains of right shoulder pain with decreased range of motion. Still with episodes of positional vertigo at night . ferritin was 200 in March prior to last phlebotomy. \par \par 06/26/2018 : Patient returns for follow up , he had phlebotomy twice after last visit . He reports sensation of fullness ( described as bubble ) in  epigastric and lower sternum , unrelated to food or exertion.\par \par 03/12/2019 Patient returns for follow up , CBC is normal . he denies any new complaints except for asymptomatic sinus bradycardia , feels he is not ready for pacemaker , also mild knee pain , to follow with ortho. \par \par \par 09/10/2019 Patient returns for follow up , last phlebotomy in June . he offers no new complaints . he continues with easy bruising and is stable from cardia standpoint . \par \par 01/13/2020\par Patient returns for a follow up visit. His last phlebotomy was in September 2019. He had a mechanical fall  4 weeks ago and had left hip fracture. Was told no need for surgical intervention. He is in pain and is getting PT. His latest CBC shows HB of 14.4 with HCT of 40 and Platelets of 325. \par Tolerating Hydrea well. \par \par 12/03/2020: MARIA LUISA is here for follow up visit for ET and hereditary hemochromatosis. He continues on Hydrea 1000mg alternating with 1500mg; tolerating well. CBC reviewed WBC: 4.61 Hgb: 13.4  Hct: 37.6% and Plt: 324. S/p TAVR/ Post OP he developed complete AV black and PPM was placed. Patient states he continues to have palpation and SOB post procedure. He will follow up with Dr. Cason for possible PCI.

## 2020-12-07 NOTE — PHYSICAL EXAM
Orders for physical therapy evaluation received and acknowledged. Per PA, patient being admitted for acute DVT; therefore, no therapy needs in the ED.   [Normal] : full range of motion and no deformities appreciated [de-identified] : PPM [de-identified] : no organomegaly. no tenderness.

## 2020-12-08 DIAGNOSIS — D47.3 ESSENTIAL (HEMORRHAGIC) THROMBOCYTHEMIA: ICD-10-CM

## 2020-12-08 DIAGNOSIS — E83.110 HEREDITARY HEMOCHROMATOSIS: ICD-10-CM

## 2020-12-18 NOTE — REASON FOR VISIT
[Follow-Up Visit] : a follow-up [FreeTextEntry2] : ET and hereditary hemochromatosis  No Vaccines Administered.

## 2021-03-01 ENCOUNTER — OUTPATIENT (OUTPATIENT)
Dept: OUTPATIENT SERVICES | Facility: HOSPITAL | Age: 81
LOS: 1 days | Discharge: HOME | End: 2021-03-01
Payer: COMMERCIAL

## 2021-03-01 DIAGNOSIS — M54.9 DORSALGIA, UNSPECIFIED: ICD-10-CM

## 2021-03-01 DIAGNOSIS — Z96.653 PRESENCE OF ARTIFICIAL KNEE JOINT, BILATERAL: Chronic | ICD-10-CM

## 2021-03-01 DIAGNOSIS — S99.919A UNSPECIFIED INJURY OF UNSPECIFIED ANKLE, INITIAL ENCOUNTER: Chronic | ICD-10-CM

## 2021-03-01 PROCEDURE — 72100 X-RAY EXAM L-S SPINE 2/3 VWS: CPT | Mod: 26

## 2021-04-12 ENCOUNTER — APPOINTMENT (OUTPATIENT)
Dept: CARDIOLOGY | Facility: CLINIC | Age: 81
End: 2021-04-12
Payer: COMMERCIAL

## 2021-04-12 VITALS
BODY MASS INDEX: 25.93 KG/M2 | WEIGHT: 202 LBS | HEART RATE: 66 BPM | TEMPERATURE: 97.8 F | DIASTOLIC BLOOD PRESSURE: 77 MMHG | HEIGHT: 74 IN | SYSTOLIC BLOOD PRESSURE: 154 MMHG

## 2021-04-12 DIAGNOSIS — I44.2 ATRIOVENTRICULAR BLOCK, COMPLETE: ICD-10-CM

## 2021-04-12 PROCEDURE — 93280 PM DEVICE PROGR EVAL DUAL: CPT

## 2021-04-13 PROBLEM — I44.2 COMPLETE ATRIOVENTRICULAR BLOCK: Status: ACTIVE | Noted: 2020-10-06

## 2021-04-13 NOTE — PHYSICAL EXAM
[General Appearance - Well Developed] : well developed [Heart Rate And Rhythm] : heart rate and rhythm were normal [] : no respiratory distress [Left Infraclavicular] : left infraclavicular area [Clean] : clean [Dry] : dry [Well-Healed] : well-healed [Bowel Sounds] : normal bowel sounds [Nail Clubbing] : no clubbing of the fingernails [Abnormal Walk] : normal gait [Skin Color & Pigmentation] : normal skin color and pigmentation [Oriented To Time, Place, And Person] : oriented to person, place, and time [FreeTextEntry1] : stable sob [Erythema] : not erythematous

## 2021-04-13 NOTE — DISCUSSION/SUMMARY
[FreeTextEntry1] : Mr. Jaime Nielsen is a 76 year-old pleasant man with history of hypertension, hyperlipidemia, coronary artery disease s/p PCI, moderate aortic stenosis, essential thrombocytosis and hereditary hemochromatosis. He has sinus bradycardia, first degree AV block, LFAB, and intermittent symptoms of lightheadedness and fatigue on exertion. He has no other symptoms and no prior syncope. It is unclear whether patient's symptoms are related to episodic worsening of AV block.

## 2021-04-13 NOTE — ASSESSMENT
[FreeTextEntry1] : s/p Dual chamber implant for CHB post TAVR procedure.\par Presents for device interrogation\par admits to have same shortness of breath going up the stairs.\par \par I interrogated and reprogrammed his device as described in procedure. His wound is healed properly, with no signs of inflammation, infection or bleeding. I discussed with patient plan of care in great details. 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 I answered all his questions to his satisfaction. Patient was pleased with the visit\par \par Patient to continue same medications.\par \par RTO in 9 months\par \par \par \par

## 2021-04-13 NOTE — PROCEDURE
[No] : not [Sinus Bradycardia] : sinus bradycardia [See Device Printout] : See device printout [Pacemaker] : pacemaker [DDD] : DDD [Voltage: ___ volts] : Voltage was [unfilled] volts [Longevity: ___ months] : The estimated remaining battery life is [unfilled] months [Threshold Testing Performed] : Threshold testing was performed [Atrial] : Atrial [Ventricular] : Ventricular [Lead Imp:  ___ohms] : lead impedance was [unfilled] ohms [Sensing Amplitude ___mv] : sensing amplitude was [unfilled] mv [___V @] : [unfilled] V [___ ms] : [unfilled] ms [Counters Reset] : the counters were reset [Asense-Vsense ___ %] : Asense-Vsense [unfilled]% [Asense-Vpace ___ %] : Asense-Vpace [unfilled]% [Apace-Vsense ___ %] : Apace-Vsense [unfilled]% [Apace-Vpace ___ %] : Apace-Vpace [unfilled]% [de-identified] : 1st degree AV block [de-identified] : [employer] [de-identified] : Cathie HENDERSON DR [de-identified] : [id number] [de-identified] : 09/02/2020 [de-identified] : 60 [de-identified] :  99.6%\par AP 48.7%

## 2021-04-13 NOTE — REASON FOR VISIT
[Follow-up Device Check] : is here today for a follow-up device check visit for [FreeTextEntry1] : Cardiologist: Dr. Cason\par \par MR. Nielsen is a 80 y/o male with PMH of HTN, CAD (s/p PCI), DLD, CKD3, Right ICA stenosis, severe AS, hemochromatosis had recent RHC and LHC (non occlusive cad) as part of TAVR work-up. Pt c/o significant dyspnea on mild exertion and had abnormal stress test as outpt. S/p TAVR on 9/2 Intra-op, the patient developed complete heart block and had a PPM inserted on 9/2/2020 \par \par Presents today for device  interrogation \par \par Denies any sob, PND, orthopnea, no palpitations, no dizziness,lightheadedness, denies chest pains, but he admits that he remains sob walking on an incline/walking up the stairs.\par ECG ( 4/12/20210 59 bpm V paced\par ECG ( 10.6/2020) 59bpm  V paced \par ECHO ( 9/11/2020) EF 60-65% trivial para valvular regurg\par cardiac cath ( 8/14/2020)  non obstructive 3 vessel CAD

## 2021-04-16 ENCOUNTER — RX RENEWAL (OUTPATIENT)
Age: 81
End: 2021-04-16

## 2021-05-06 ENCOUNTER — LABORATORY RESULT (OUTPATIENT)
Age: 81
End: 2021-05-06

## 2021-05-06 ENCOUNTER — OUTPATIENT (OUTPATIENT)
Dept: OUTPATIENT SERVICES | Facility: HOSPITAL | Age: 81
LOS: 1 days | Discharge: HOME | End: 2021-05-06

## 2021-05-06 ENCOUNTER — APPOINTMENT (OUTPATIENT)
Dept: HEMATOLOGY ONCOLOGY | Facility: CLINIC | Age: 81
End: 2021-05-06
Payer: MEDICARE

## 2021-05-06 VITALS
DIASTOLIC BLOOD PRESSURE: 69 MMHG | HEART RATE: 67 BPM | TEMPERATURE: 98.6 F | BODY MASS INDEX: 26.05 KG/M2 | WEIGHT: 203 LBS | SYSTOLIC BLOOD PRESSURE: 136 MMHG | HEIGHT: 74 IN | RESPIRATION RATE: 20 BRPM

## 2021-05-06 DIAGNOSIS — Z96.653 PRESENCE OF ARTIFICIAL KNEE JOINT, BILATERAL: Chronic | ICD-10-CM

## 2021-05-06 DIAGNOSIS — S99.919A UNSPECIFIED INJURY OF UNSPECIFIED ANKLE, INITIAL ENCOUNTER: Chronic | ICD-10-CM

## 2021-05-06 LAB
ALBUMIN SERPL ELPH-MCNC: 4.3 G/DL
ALP BLD-CCNC: 80 U/L
ALT SERPL-CCNC: 16 U/L
ANION GAP SERPL CALC-SCNC: 13 MMOL/L
AST SERPL-CCNC: 25 U/L
BILIRUB SERPL-MCNC: 0.5 MG/DL
BUN SERPL-MCNC: 30 MG/DL
CALCIUM SERPL-MCNC: 8.9 MG/DL
CHLORIDE SERPL-SCNC: 104 MMOL/L
CO2 SERPL-SCNC: 24 MMOL/L
CREAT SERPL-MCNC: 1.9 MG/DL
GLUCOSE SERPL-MCNC: 119 MG/DL
HCT VFR BLD CALC: 38.6 %
HGB BLD-MCNC: 14.1 G/DL
MCHC RBC-ENTMCNC: 36.5 G/DL
MCHC RBC-ENTMCNC: 48.3 PG
MCV RBC AUTO: 132.2 FL
PLATELET # BLD AUTO: 234 K/UL
PMV BLD: 10 FL
POTASSIUM SERPL-SCNC: 4.5 MMOL/L
PROT SERPL-MCNC: 7.4 G/DL
RBC # BLD: 2.92 M/UL
RBC # FLD: 12.7 %
SODIUM SERPL-SCNC: 141 MMOL/L
WBC # FLD AUTO: 3.9 K/UL

## 2021-05-06 PROCEDURE — 99213 OFFICE O/P EST LOW 20 MIN: CPT

## 2021-05-06 NOTE — HISTORY OF PRESENT ILLNESS
[de-identified] : \par 76/M presenting for follow-up for essential thrombocythemia and hemochromatosis. He is on Hydrea 1500 mg daily, tolerating well. His last phlebotomy was in March 2017. \par He has History of DJD s/p knee arthroplasty , chronic shoulder pain, CAD , aortic stenosis , CHF , positional vertigo worse when turning in bed. hypertension, regimen changed in May2017 .  [de-identified] : The patient returns for follow-up , he complains of right shoulder pain with decreased range of motion. Still with episodes of positional vertigo at night . ferritin was 200 in March prior to last phlebotomy. \par \par 06/26/2018 : Patient returns for follow up , he had phlebotomy twice after last visit . He reports sensation of fullness ( described as bubble ) in  epigastric and lower sternum , unrelated to food or exertion.\par \par 03/12/2019 Patient returns for follow up , CBC is normal . he denies any new complaints except for asymptomatic sinus bradycardia , feels he is not ready for pacemaker , also mild knee pain , to follow with ortho. \par \par \par 09/10/2019 Patient returns for follow up , last phlebotomy in June . he offers no new complaints . he continues with easy bruising and is stable from cardia standpoint . \par \par 05/06/2021 Patient returns for follow up , he had his last phlebotomy in 2021 , ferritin is stable since under 250 , CBC Is stable on hydrea . He is having more issues with postural dizziness , vertigo , he fell once and injured right shoulder , no xray done and is on muscle relaxants with some relief. In addition he suffered low back compression fracture during heavy lifting

## 2021-05-06 NOTE — PHYSICAL EXAM
[Normal] : normoactive bowel sounds, soft and nontender, no hepatosplenomegaly or masses appreciated [de-identified] : grade 2/6 systolic murmur [de-identified] : no organomegaly. no tenderness.

## 2021-05-06 NOTE — ASSESSMENT
[FreeTextEntry1] : Essential thrombocythemia controlled with hydrea , hereditary hemochromatosis phlebotomy with infrequent phlebotomy .\par Plaan ; ferritin , continue hydrea , same dose , follow p in 6 months .

## 2021-05-07 LAB — FERRITIN SERPL-MCNC: 298 NG/ML

## 2021-05-10 DIAGNOSIS — E83.110 HEREDITARY HEMOCHROMATOSIS: ICD-10-CM

## 2021-06-21 NOTE — PATIENT PROFILE ADULT - ARRIVAL FROM
Physical Therapy Daily Treatment Note      Patient: Maurizio Mcnair   : 1969  Referring practitioner: Rodney Vega MD  Date of Initial Visit: Type: THERAPY  Noted: 2021  Today's Date: 2021  Patient seen for 12 sessions         Maurizio Mcnair reports: he was sick with a stomach bug over the weekend and didn't get to do a lot of his HEP.      Subjective     Objective          Active Range of Motion     Right Knee   Flexion: 100 degrees with pain      See Exercise, Manual, and Modality Logs for complete treatment.       Assessment/Plan  Froy is ambulating with minimally antalgic gait and no assistive device today with good form. He is very close to achieving full terminal knee extension during stance phase of the R LE. He could achieve AAROM knee flexion of the R knee to 100 degrees today but required some help from therapist.  Visit Diagnoses:    ICD-10-CM ICD-9-CM   1. Presence of artificial knee joint, right  Z96.651 V43.65   2. Difficulty walking  R26.2 719.7   3. Chronic pain of right knee  M25.561 719.46    G89.29 338.29       Progress per Plan of Care           Timed:  Manual Therapy:         mins  28901;  Therapeutic Exercise:    15     mins  53600;     Neuromuscular Cassandra:        mins  32602;    Therapeutic Activity:     30     mins  31815;     Gait Training:           mins  23677;     Ultrasound:          mins  83779;    Electrical Stimulation:         mins  58141 ( );    Untimed:  Electrical Stimulation:         mins  69525 ( );  Mechanical Traction:         mins  91963;     Timed Treatment:  45    mins   Total Treatment:     45   mins  Abdi Smalls PT, DPT, OCS  Physical Therapist   Home

## 2021-07-02 ENCOUNTER — RX RENEWAL (OUTPATIENT)
Age: 81
End: 2021-07-02

## 2021-08-27 ENCOUNTER — APPOINTMENT (OUTPATIENT)
Age: 81
End: 2021-08-27
Payer: COMMERCIAL

## 2021-08-27 VITALS
BODY MASS INDEX: 25.28 KG/M2 | SYSTOLIC BLOOD PRESSURE: 120 MMHG | OXYGEN SATURATION: 94 % | HEIGHT: 74 IN | WEIGHT: 197 LBS | HEART RATE: 67 BPM | RESPIRATION RATE: 67 BRPM | DIASTOLIC BLOOD PRESSURE: 80 MMHG

## 2021-08-27 PROCEDURE — 71046 X-RAY EXAM CHEST 2 VIEWS: CPT

## 2021-08-27 PROCEDURE — 99203 OFFICE O/P NEW LOW 30 MIN: CPT | Mod: 25

## 2021-08-27 NOTE — HISTORY OF PRESENT ILLNESS
[Shortness of Breath] : Shortness of Breath [Dyspnea] : dyspnea [Fatigue] : fatigue [Weakness] : weakness [Chest Pain] : no chest pain [Cough] : no cough [Wheezing] : no wheezing [Fever] : no fever [Leg Pain] : no leg pain [Edema] : no edema [TextBox_4] : SENT BY CARDIO FOR PERSISTENT SOB\par SP TAVR/ STENT / PPM\par STILL CO SOB, NON SMOKER\par NO COUGH, WHEEZING

## 2021-08-27 NOTE — PHYSICAL EXAM
[No Acute Distress] : no acute distress [Normal Oropharynx] : normal oropharynx [Normal Appearance] : normal appearance [No Neck Mass] : no neck mass [No Resp Distress] : no resp distress [Clear to Auscultation Bilaterally] : clear to auscultation bilaterally [No Abnormalities] : no abnormalities [Benign] : benign [Normal Gait] : normal gait [No Clubbing] : no clubbing [No Cyanosis] : no cyanosis [No Edema] : no edema [FROM] : FROM [Normal Color/ Pigmentation] : normal color/ pigmentation [No Focal Deficits] : no focal deficits [Oriented x3] : oriented x3 [Normal Affect] : normal affect [TextBox_54] : RAUL 2/6

## 2021-08-27 NOTE — DISCUSSION/SUMMARY
[FreeTextEntry1] : SOB ON EXERTION : SP TAVR/ STENT/ PPM/ HEMOCHRO/ ESSENTAIL THROMBOCYTOSIS, NON SMOKER\par NO CLEAR PULMONARY ETIOLOGY\par PFT/ CHEST CT/ POX ON EXERTION THAN REGROUP

## 2021-09-02 ENCOUNTER — APPOINTMENT (OUTPATIENT)
Dept: CARDIOLOGY | Facility: CLINIC | Age: 81
End: 2021-09-02
Payer: COMMERCIAL

## 2021-09-02 ENCOUNTER — RESULT CHARGE (OUTPATIENT)
Age: 81
End: 2021-09-02

## 2021-09-02 ENCOUNTER — NON-APPOINTMENT (OUTPATIENT)
Age: 81
End: 2021-09-02

## 2021-09-02 VITALS
HEART RATE: 67 BPM | SYSTOLIC BLOOD PRESSURE: 150 MMHG | DIASTOLIC BLOOD PRESSURE: 99 MMHG | RESPIRATION RATE: 18 BRPM | TEMPERATURE: 98.4 F | HEIGHT: 74 IN | OXYGEN SATURATION: 97 % | WEIGHT: 197 LBS | BODY MASS INDEX: 25.28 KG/M2

## 2021-09-02 DIAGNOSIS — Z95.2 PRESENCE OF PROSTHETIC HEART VALVE: ICD-10-CM

## 2021-09-02 PROCEDURE — 93306 TTE W/DOPPLER COMPLETE: CPT

## 2021-09-02 PROCEDURE — 93000 ELECTROCARDIOGRAM COMPLETE: CPT

## 2021-09-02 PROCEDURE — 99213 OFFICE O/P EST LOW 20 MIN: CPT | Mod: 25

## 2021-09-03 PROBLEM — Z95.2 S/P TAVR (TRANSCATHETER AORTIC VALVE REPLACEMENT): Status: ACTIVE | Noted: 2020-09-08

## 2021-09-05 NOTE — ASSESSMENT
[FreeTextEntry1] : Mr. MARIA LUISA COATS is a 80 year M that arrives today for a post op visit. Patient is status post TAVR on 9/2/20 via transfemoral approach. Patient arrives to the office for their first post op visit. On arrival patient denies fever, chills nausea, and vomiting. Denies SOB or palpitation. Patient states that they feel tired and not making improvement. Echo stat, shows no pericardial effusion. Plan will be to f/u with EP next week to evaluate PPM settings. \par  \par Plan:\par Echocardiogram reviewed with patient\par Aortic Valve not contributing to pt's symptoms, functioning appropriately \par F/U with Dr. Cason for cardiac care and potential stenting of RCA \par F/U with PMD for routine medical care\par \par GUME, Indira Gomez [facility], am acting as scribe for Dr. Ontiveros

## 2021-09-05 NOTE — END OF VISIT
[FreeTextEntry3] : Seen / examined above reviewed.\par \par Remains active.\par Stable exertional dyspnea.\par Otherwise, feels well.\par \par ECHO: nL LVSF and THV function.\par \par Agree with pulmonary evaluation as dyspnea seems disproportionate to CV disease.  PCI if RCA is also a consideration.\par \par Continue cardiac Rx / follow-up per Dr. Cason.\par Follow-up Valve Clinic prn.

## 2021-09-05 NOTE — HISTORY OF PRESENT ILLNESS
[FreeTextEntry1] : Mr. MARIA LUISA COATS 81 year M arrives today for evaluation of their aortic stenosis S/P TAVR Via TF Approach September 2020, Intra-op with heart block requiring PPM. Patient PMH include HTN, DLD, CKD3, moderate AS, hemochromatosis. Here today for 1 year TAVR Follow up visit and Echocardiogram and blood work.\par \par SOB especially with stairs, no SOB with short distance/flat road\par SOB most felt on exertion; Denies fever, chills, recent illness\par Occasionally feels heart racing- even when not doing any activity\par April 2021- Pt got dizzy and fell, device was interrogated and normal, is pending pulmonary workup\par When he lays down feels as if his "head is spinning"- his Cardiologist may have mentioned vertigo but does not want to see any new doctors\par \par His healthcare teams is as follows:\par PMD: Dr. Blake \par Cardio: Dr. Walker\par Pulm: Dr. Rizo\par \par \par \par

## 2021-09-07 ENCOUNTER — NON-APPOINTMENT (OUTPATIENT)
Age: 81
End: 2021-09-07

## 2021-09-07 ENCOUNTER — APPOINTMENT (OUTPATIENT)
Dept: CARDIOLOGY | Facility: CLINIC | Age: 81
End: 2021-09-07
Payer: COMMERCIAL

## 2021-09-07 PROCEDURE — 93294 REM INTERROG EVL PM/LDLS PM: CPT

## 2021-09-07 PROCEDURE — 93296 REM INTERROG EVL PM/IDS: CPT

## 2021-09-08 ENCOUNTER — RX RENEWAL (OUTPATIENT)
Age: 81
End: 2021-09-08

## 2021-09-25 ENCOUNTER — OUTPATIENT (OUTPATIENT)
Dept: OUTPATIENT SERVICES | Facility: HOSPITAL | Age: 81
LOS: 1 days | Discharge: HOME | End: 2021-09-25

## 2021-09-25 ENCOUNTER — LABORATORY RESULT (OUTPATIENT)
Age: 81
End: 2021-09-25

## 2021-09-25 DIAGNOSIS — Z11.59 ENCOUNTER FOR SCREENING FOR OTHER VIRAL DISEASES: ICD-10-CM

## 2021-09-25 DIAGNOSIS — S99.919A UNSPECIFIED INJURY OF UNSPECIFIED ANKLE, INITIAL ENCOUNTER: Chronic | ICD-10-CM

## 2021-09-25 DIAGNOSIS — Z96.653 PRESENCE OF ARTIFICIAL KNEE JOINT, BILATERAL: Chronic | ICD-10-CM

## 2021-09-26 ENCOUNTER — OUTPATIENT (OUTPATIENT)
Dept: OUTPATIENT SERVICES | Facility: HOSPITAL | Age: 81
LOS: 1 days | Discharge: HOME | End: 2021-09-26
Payer: COMMERCIAL

## 2021-09-26 ENCOUNTER — RESULT REVIEW (OUTPATIENT)
Age: 81
End: 2021-09-26

## 2021-09-26 DIAGNOSIS — S99.919A UNSPECIFIED INJURY OF UNSPECIFIED ANKLE, INITIAL ENCOUNTER: Chronic | ICD-10-CM

## 2021-09-26 DIAGNOSIS — Z96.653 PRESENCE OF ARTIFICIAL KNEE JOINT, BILATERAL: Chronic | ICD-10-CM

## 2021-09-26 DIAGNOSIS — R06.00 DYSPNEA, UNSPECIFIED: ICD-10-CM

## 2021-09-26 DIAGNOSIS — R06.02 SHORTNESS OF BREATH: ICD-10-CM

## 2021-09-26 DIAGNOSIS — R07.9 CHEST PAIN, UNSPECIFIED: ICD-10-CM

## 2021-09-26 PROCEDURE — 71250 CT THORAX DX C-: CPT | Mod: 26,MH

## 2021-09-28 ENCOUNTER — OUTPATIENT (OUTPATIENT)
Dept: OUTPATIENT SERVICES | Facility: HOSPITAL | Age: 81
LOS: 1 days | Discharge: HOME | End: 2021-09-28
Payer: COMMERCIAL

## 2021-09-28 DIAGNOSIS — R06.02 SHORTNESS OF BREATH: ICD-10-CM

## 2021-09-28 DIAGNOSIS — Z96.653 PRESENCE OF ARTIFICIAL KNEE JOINT, BILATERAL: Chronic | ICD-10-CM

## 2021-09-28 DIAGNOSIS — S99.919A UNSPECIFIED INJURY OF UNSPECIFIED ANKLE, INITIAL ENCOUNTER: Chronic | ICD-10-CM

## 2021-09-28 PROCEDURE — 94060 EVALUATION OF WHEEZING: CPT | Mod: 26

## 2021-09-28 PROCEDURE — 94727 GAS DIL/WSHOT DETER LNG VOL: CPT | Mod: 26

## 2021-09-28 PROCEDURE — 94729 DIFFUSING CAPACITY: CPT | Mod: 26

## 2021-11-12 ENCOUNTER — RX RENEWAL (OUTPATIENT)
Age: 81
End: 2021-11-12

## 2021-11-16 ENCOUNTER — APPOINTMENT (OUTPATIENT)
Age: 81
End: 2021-11-16
Payer: COMMERCIAL

## 2021-11-16 VITALS
DIASTOLIC BLOOD PRESSURE: 76 MMHG | RESPIRATION RATE: 14 BRPM | SYSTOLIC BLOOD PRESSURE: 126 MMHG | BODY MASS INDEX: 25.54 KG/M2 | HEIGHT: 74 IN | HEART RATE: 74 BPM | OXYGEN SATURATION: 95 % | WEIGHT: 199 LBS

## 2021-11-16 DIAGNOSIS — J84.89 OTHER SPECIFIED INTERSTITIAL PULMONARY DISEASES: ICD-10-CM

## 2021-11-16 DIAGNOSIS — R06.00 DYSPNEA, UNSPECIFIED: ICD-10-CM

## 2021-11-16 DIAGNOSIS — R91.1 SOLITARY PULMONARY NODULE: ICD-10-CM

## 2021-11-16 PROCEDURE — 99213 OFFICE O/P EST LOW 20 MIN: CPT

## 2021-11-16 NOTE — DISCUSSION/SUMMARY
[FreeTextEntry1] : SOB ON EXERTION : SP TAVR/ STENT/ PPM/ HEMOCHRO/ ESSENTAIL THROMBOCYTOSIS, NON SMOKER\par NO DESATURATION WITH EXERTION\par MILD REDUCTION IN DLCO\par CHEST CT ? NASIP ( REPORTS EXTENISIVE CHEMICAL EXPOSURE)\par UNLIKELY ETIOLOGY OF HIS SOB\par CARDIO FUP

## 2021-12-07 ENCOUNTER — NON-APPOINTMENT (OUTPATIENT)
Age: 81
End: 2021-12-07

## 2021-12-07 ENCOUNTER — APPOINTMENT (OUTPATIENT)
Dept: CARDIOLOGY | Facility: CLINIC | Age: 81
End: 2021-12-07
Payer: COMMERCIAL

## 2021-12-07 PROCEDURE — 93296 REM INTERROG EVL PM/IDS: CPT | Mod: NC

## 2021-12-07 PROCEDURE — 93294 REM INTERROG EVL PM/LDLS PM: CPT

## 2022-01-10 ENCOUNTER — APPOINTMENT (OUTPATIENT)
Dept: CARDIOLOGY | Facility: CLINIC | Age: 82
End: 2022-01-10

## 2022-01-14 NOTE — ASU PATIENT PROFILE, ADULT - NSSUBSTANCEUSE_GEN_ALL_CORE_SD
Maggie Whelan is a 45 y.o. female    Chief Complaint   Patient presents with    New Patient     Malignant Neoplasm of Right Breast       1. Have you been to the ER, urgent care clinic since your last visit? Hospitalized since your last visit? No    2. Have you seen or consulted any other health care providers outside of the [address] [place] [address] since your last visit? Include any pap smears or colon screening.  Yes PCP [facility]- Jules Huff never used

## 2022-01-28 ENCOUNTER — RX RENEWAL (OUTPATIENT)
Age: 82
End: 2022-01-28

## 2022-02-14 ENCOUNTER — OUTPATIENT (OUTPATIENT)
Dept: OUTPATIENT SERVICES | Facility: HOSPITAL | Age: 82
LOS: 1 days | Discharge: HOME | End: 2022-02-14

## 2022-02-14 ENCOUNTER — APPOINTMENT (OUTPATIENT)
Dept: HEMATOLOGY ONCOLOGY | Facility: CLINIC | Age: 82
End: 2022-02-14
Payer: COMMERCIAL

## 2022-02-14 ENCOUNTER — LABORATORY RESULT (OUTPATIENT)
Age: 82
End: 2022-02-14

## 2022-02-14 VITALS
SYSTOLIC BLOOD PRESSURE: 131 MMHG | WEIGHT: 206 LBS | OXYGEN SATURATION: 98 % | DIASTOLIC BLOOD PRESSURE: 81 MMHG | RESPIRATION RATE: 14 BRPM | TEMPERATURE: 97.7 F | HEIGHT: 74 IN | HEART RATE: 50 BPM | BODY MASS INDEX: 26.44 KG/M2

## 2022-02-14 DIAGNOSIS — Z96.653 PRESENCE OF ARTIFICIAL KNEE JOINT, BILATERAL: Chronic | ICD-10-CM

## 2022-02-14 DIAGNOSIS — S99.919A UNSPECIFIED INJURY OF UNSPECIFIED ANKLE, INITIAL ENCOUNTER: Chronic | ICD-10-CM

## 2022-02-14 LAB
ALBUMIN SERPL ELPH-MCNC: 4.8 G/DL
ALP BLD-CCNC: 83 U/L
ALT SERPL-CCNC: 19 U/L
ANION GAP SERPL CALC-SCNC: 8 MMOL/L
AST SERPL-CCNC: 27 U/L
BILIRUB SERPL-MCNC: 0.6 MG/DL
BUN SERPL-MCNC: 24 MG/DL
CALCIUM SERPL-MCNC: 8.9 MG/DL
CHLORIDE SERPL-SCNC: 101 MMOL/L
CO2 SERPL-SCNC: 26 MMOL/L
CREAT SERPL-MCNC: 1.8 MG/DL
GLUCOSE SERPL-MCNC: 114 MG/DL
HCT VFR BLD CALC: 40.1 %
HGB BLD-MCNC: 14.5 G/DL
MCHC RBC-ENTMCNC: 36.2 G/DL
MCHC RBC-ENTMCNC: 49.2 PG
MCV RBC AUTO: 135.9 FL
PLATELET # BLD AUTO: 226 K/UL
PMV BLD: 10.2 FL
POTASSIUM SERPL-SCNC: 4.8 MMOL/L
PROT SERPL-MCNC: 7.6 G/DL
RBC # BLD: 2.95 M/UL
RBC # FLD: 12.8 %
SODIUM SERPL-SCNC: 135 MMOL/L
WBC # FLD AUTO: 3.58 K/UL

## 2022-02-14 PROCEDURE — 99213 OFFICE O/P EST LOW 20 MIN: CPT

## 2022-02-14 NOTE — HISTORY OF PRESENT ILLNESS
[de-identified] : \par 76/M presenting for follow-up for essential thrombocythemia and hemochromatosis. He is on Hydrea 1500 mg daily, tolerating well. His last phlebotomy was in March 2017. \par He has History of DJD s/p knee arthroplasty , chronic shoulder pain, CAD , aortic stenosis , CHF , positional vertigo worse when turning in bed. hypertension, regimen changed in May2017 .  [de-identified] : The patient returns for follow-up , he complains of right shoulder pain with decreased range of motion. Still with episodes of positional vertigo at night . ferritin was 200 in March prior to last phlebotomy. \par \par 06/26/2018 : Patient returns for follow up , he had phlebotomy twice after last visit . He reports sensation of fullness ( described as bubble ) in  epigastric and lower sternum , unrelated to food or exertion.\par \par 03/12/2019 Patient returns for follow up , CBC is normal . he denies any new complaints except for asymptomatic sinus bradycardia , feels he is not ready for pacemaker , also mild knee pain , to follow with ortho. \par \par \par 09/10/2019 Patient returns for follow up , last phlebotomy in June . he offers no new complaints . he continues with easy bruising and is stable from cardia standpoint . \par \par 05/06/2021 Patient returns for follow up , he had his last phlebotomy in 2021 , ferritin is stable since under 250 , CBC Is stable on hydrea . He is having more issues with postural dizziness , vertigo , he fell once and injured right shoulder , no xray done and is on muscle relaxants with some relief. In addition he suffered low back compression fracture during heavy lifting \par \par 2/14/22- Patient returns for f/u visit for management of Hereditary hemochromatosis and essential thrombocythemia. He is on hydrea 1000 mg daily . He is also on ASA 81 and plavix. He is doing well with no new complaints. No fever, chills, night sweats, weight loss. His last phlebotomy was about a year ago.

## 2022-02-14 NOTE — ASSESSMENT
[FreeTextEntry1] : Essential thrombocythemia controlled with hydrea , hereditary hemochromatosis phlebotomy with infrequent phlebotomy .\par \par CBC today shows WBC 3.5, Hb 14.5 plt 226. \par \par Plan ; Continue hydrea 2 tabs daily. Also on ASA 81 and Plavix \par Will check Ferritin as well. \par Will arrange rowdy phlebotomy accordingly. \par \par \par RTC in 6 months., \par \par Patient was seen and examined and discussed with Dr Herrera.

## 2022-02-14 NOTE — PHYSICAL EXAM
[Normal] : normal appearance, no rash, nodules, vesicles, ulcers, erythema [de-identified] : grade 2/6 systolic murmur. Has PPM/ [de-identified] : no organomegaly. no tenderness.  [de-identified] : Chronic arthritis

## 2022-02-15 DIAGNOSIS — E83.110 HEREDITARY HEMOCHROMATOSIS: ICD-10-CM

## 2022-02-15 DIAGNOSIS — D47.3 ESSENTIAL (HEMORRHAGIC) THROMBOCYTHEMIA: ICD-10-CM

## 2022-02-15 LAB — FERRITIN SERPL-MCNC: 299 NG/ML

## 2022-03-07 ENCOUNTER — NON-APPOINTMENT (OUTPATIENT)
Age: 82
End: 2022-03-07

## 2022-03-08 ENCOUNTER — APPOINTMENT (OUTPATIENT)
Dept: CARDIOLOGY | Facility: CLINIC | Age: 82
End: 2022-03-08
Payer: COMMERCIAL

## 2022-03-08 PROCEDURE — 93296 REM INTERROG EVL PM/IDS: CPT

## 2022-03-08 PROCEDURE — 93294 REM INTERROG EVL PM/LDLS PM: CPT

## 2022-06-07 ENCOUNTER — APPOINTMENT (OUTPATIENT)
Dept: CARDIOLOGY | Facility: CLINIC | Age: 82
End: 2022-06-07
Payer: COMMERCIAL

## 2022-06-07 ENCOUNTER — NON-APPOINTMENT (OUTPATIENT)
Age: 82
End: 2022-06-07

## 2022-06-07 PROCEDURE — 93294 REM INTERROG EVL PM/LDLS PM: CPT

## 2022-06-07 PROCEDURE — 93296 REM INTERROG EVL PM/IDS: CPT

## 2022-07-14 DIAGNOSIS — M54.50 LOW BACK PAIN, UNSPECIFIED: ICD-10-CM

## 2022-07-25 ENCOUNTER — OUTPATIENT (OUTPATIENT)
Dept: OUTPATIENT SERVICES | Facility: HOSPITAL | Age: 82
LOS: 1 days | Discharge: HOME | End: 2022-07-25

## 2022-07-25 ENCOUNTER — RESULT REVIEW (OUTPATIENT)
Age: 82
End: 2022-07-25

## 2022-07-25 DIAGNOSIS — S99.919A UNSPECIFIED INJURY OF UNSPECIFIED ANKLE, INITIAL ENCOUNTER: Chronic | ICD-10-CM

## 2022-07-25 DIAGNOSIS — M54.9 DORSALGIA, UNSPECIFIED: ICD-10-CM

## 2022-07-25 DIAGNOSIS — Z96.653 PRESENCE OF ARTIFICIAL KNEE JOINT, BILATERAL: Chronic | ICD-10-CM

## 2022-07-25 PROCEDURE — 72131 CT LUMBAR SPINE W/O DYE: CPT | Mod: 26

## 2022-07-28 ENCOUNTER — APPOINTMENT (OUTPATIENT)
Dept: ORTHOPEDIC SURGERY | Facility: CLINIC | Age: 82
End: 2022-07-28

## 2022-08-29 ENCOUNTER — LABORATORY RESULT (OUTPATIENT)
Age: 82
End: 2022-08-29

## 2022-08-29 ENCOUNTER — APPOINTMENT (OUTPATIENT)
Dept: HEMATOLOGY ONCOLOGY | Facility: CLINIC | Age: 82
End: 2022-08-29

## 2022-08-29 VITALS
HEIGHT: 71 IN | HEART RATE: 60 BPM | OXYGEN SATURATION: 98 % | SYSTOLIC BLOOD PRESSURE: 128 MMHG | TEMPERATURE: 97.7 F | WEIGHT: 187 LBS | BODY MASS INDEX: 26.18 KG/M2 | DIASTOLIC BLOOD PRESSURE: 60 MMHG

## 2022-08-29 PROCEDURE — 99213 OFFICE O/P EST LOW 20 MIN: CPT

## 2022-08-30 ENCOUNTER — APPOINTMENT (OUTPATIENT)
Dept: ORTHOPEDIC SURGERY | Facility: CLINIC | Age: 82
End: 2022-08-30

## 2022-08-30 DIAGNOSIS — M51.9 UNSPECIFIED THORACIC, THORACOLUMBAR AND LUMBOSACRAL INTERVERTEBRAL DISC DISORDER: ICD-10-CM

## 2022-08-30 DIAGNOSIS — M16.12 UNILATERAL PRIMARY OSTEOARTHRITIS, LEFT HIP: ICD-10-CM

## 2022-08-30 DIAGNOSIS — M70.22 OLECRANON BURSITIS, LEFT ELBOW: ICD-10-CM

## 2022-08-30 LAB
FERRITIN SERPL-MCNC: 363 NG/ML
HCT VFR BLD CALC: 35.6 %
HGB BLD-MCNC: 12.4 G/DL
MCHC RBC-ENTMCNC: 34.8 G/DL
MCHC RBC-ENTMCNC: 44.8 PG
MCV RBC AUTO: 128.5 FL
PLATELET # BLD AUTO: 290 K/UL
PMV BLD: 10.2 FL
RBC # BLD: 2.77 M/UL
RBC # FLD: 15.1 %
WBC # FLD AUTO: 4.09 K/UL

## 2022-08-30 PROCEDURE — 99213 OFFICE O/P EST LOW 20 MIN: CPT

## 2022-08-30 NOTE — HISTORY OF PRESENT ILLNESS
[de-identified] : \par 76/M presenting for follow-up for essential thrombocythemia and hemochromatosis. He is on Hydrea 1500 mg daily, tolerating well. His last phlebotomy was in March 2017. \par He has History of DJD s/p knee arthroplasty , chronic shoulder pain, CAD , aortic stenosis , CHF , positional vertigo worse when turning in bed. hypertension, regimen changed in May2017 .  [de-identified] : The patient returns for follow-up , he complains of right shoulder pain with decreased range of motion. Still with episodes of positional vertigo at night . ferritin was 200 in March prior to last phlebotomy. \par \par 06/26/2018 : Patient returns for follow up , he had phlebotomy twice after last visit . He reports sensation of fullness ( described as bubble ) in  epigastric and lower sternum , unrelated to food or exertion.\par \par 03/12/2019 Patient returns for follow up , CBC is normal . he denies any new complaints except for asymptomatic sinus bradycardia , feels he is not ready for pacemaker , also mild knee pain , to follow with ortho. \par \par \par 09/10/2019 Patient returns for follow up , last phlebotomy in June . he offers no new complaints . he continues with easy bruising and is stable from cardia standpoint . \par \par 05/06/2021 Patient returns for follow up , he had his last phlebotomy in 2021 , ferritin is stable since under 250 , CBC Is stable on hydrea . He is having more issues with postural dizziness , vertigo , he fell once and injured right shoulder , no xray done and is on muscle relaxants with some relief. In addition he suffered low back compression fracture during heavy lifting \par \par 2/14/22- Patient returns for f/u visit for management of Hereditary hemochromatosis and essential thrombocythemia. He is on hydrea 1000 mg daily . He is also on ASA 81 and plavix. He is doing well with no new complaints. No fever, chills, night sweats, weight loss. His last phlebotomy was about a year ago. \par \par 8/29/22: Patient returns for followup of his ET and hereditary hemochromatosis. He remains on 1000mg of hydrea. His platelets today are 290, hg 12.4 g/dL. Last ferritin 299 from Feb 2022.He had a fall which resulted in compression fractures at L1, L2 and L4. MR also showed multiple disc bulges, foraminal narrowing, spinal stenosis and degenerative changes. He is having significant back pain. He has an appointment with orthopedics tomorrow. Additionally, he has lost about 20 lbs since this occurred. His BP decreased as well and his cardiologist held his HTN medications.

## 2022-08-30 NOTE — END OF VISIT
[FreeTextEntry3] : “I have personally seen and examined this patient.  I have fully participated in the care of this patient. I have reviewed all pertinent clinical information, including history physical exam, plan and the Resident’s note and agree except as noted.”\par \par \par

## 2022-08-30 NOTE — HISTORY OF PRESENT ILLNESS
[de-identified] : Patient Complaint - low back pain and left elbow pain 82 years of age was lifting the  few weeks ago then\par started having pain in his back using cane also recent fall on his left elbow with a little swollen has a scrape healing\par with some swelling have prominence back 2019

## 2022-08-30 NOTE — PHYSICAL EXAM
[Normal] : normal appearance, no rash, nodules, vesicles, ulcers, erythema [de-identified] : grade 2/6 systolic murmur. Has PPM/ [de-identified] : no organomegaly. no tenderness.  [de-identified] : Chronic arthritis

## 2022-08-30 NOTE — ASSESSMENT
[FreeTextEntry1] :  making good progress he is inclined to hold off on therapy warm compresses to the elbow no medication reassured the hips should not give him enough of an issue to need replacement will keep me posted if he wants to go for therapy   he is using the cane which is fine

## 2022-08-30 NOTE — DATA REVIEWED
[CT Scan] : CT scan [Lumbar Spine] : lumbar spine [Report was reviewed and noted in the chart] : The report was reviewed and noted in the chart [FreeTextEntry1] :  CT lumbar spine 07/25/2022: Degenerative changes of the hips left more than right osteopenia several compression fractures 1 2 in for age indeterminate bulging discs some stenosis

## 2022-08-30 NOTE — IMAGING
[de-identified] :  left elbow good motion still little swelling at the tip there is a little drainage serous still no pain \par \par Lumbar mild spasm limited motion no neurologic deficits\par \par Hips left more than right limited motion

## 2022-08-30 NOTE — REASON FOR VISIT
[FreeTextEntry2] : Left elbow and back pain\par Back symptoms are getting better slowly did get CT scan 07/25/2022 using a cane Medrol pack helped a little bit still little soreness on the elbow

## 2022-08-30 NOTE — ASSESSMENT
[FreeTextEntry1] : Essential thrombocythemia controlled with hydrea , hereditary hemochromatosis phlebotomy with infrequent phlebotomy .\par \par CBC today shows WBC 4.09, Hb 12.4 plt 290. \par \par Plan ; Continue hydrea 2 tabs daily. Also on ASA 81 and Plavix \par           Will check Ferritin as well. \par           Will arrange rowdy phlebotomy accordingly. \par           F/u with orthopedics for compression fractures, if a procedure is planned he was advised to inform us to discuss AC/AP\par \par RTC in 3 months., \par \par Patient was seen and examined and discussed with Dr Herrera.

## 2022-09-06 ENCOUNTER — APPOINTMENT (OUTPATIENT)
Dept: CARDIOLOGY | Facility: CLINIC | Age: 82
End: 2022-09-06

## 2022-09-06 ENCOUNTER — NON-APPOINTMENT (OUTPATIENT)
Age: 82
End: 2022-09-06

## 2022-09-06 PROCEDURE — 93296 REM INTERROG EVL PM/IDS: CPT

## 2022-09-06 PROCEDURE — 93294 REM INTERROG EVL PM/LDLS PM: CPT

## 2022-11-28 ENCOUNTER — OUTPATIENT (OUTPATIENT)
Dept: OUTPATIENT SERVICES | Facility: HOSPITAL | Age: 82
LOS: 1 days | End: 2022-11-28

## 2022-11-28 ENCOUNTER — APPOINTMENT (OUTPATIENT)
Dept: HEMATOLOGY ONCOLOGY | Facility: CLINIC | Age: 82
End: 2022-11-28

## 2022-11-28 VITALS
HEIGHT: 71 IN | WEIGHT: 200 LBS | SYSTOLIC BLOOD PRESSURE: 118 MMHG | TEMPERATURE: 97.8 F | RESPIRATION RATE: 16 BRPM | BODY MASS INDEX: 28 KG/M2 | DIASTOLIC BLOOD PRESSURE: 76 MMHG | HEART RATE: 87 BPM

## 2022-11-28 DIAGNOSIS — Z96.653 PRESENCE OF ARTIFICIAL KNEE JOINT, BILATERAL: Chronic | ICD-10-CM

## 2022-11-28 DIAGNOSIS — S99.919A UNSPECIFIED INJURY OF UNSPECIFIED ANKLE, INITIAL ENCOUNTER: Chronic | ICD-10-CM

## 2022-11-28 DIAGNOSIS — E83.110 HEREDITARY HEMOCHROMATOSIS: ICD-10-CM

## 2022-11-28 LAB
BASOPHILS # BLD AUTO: 0.01 K/UL
BASOPHILS NFR BLD AUTO: 0.2 %
EOSINOPHIL # BLD AUTO: 0.04 K/UL
EOSINOPHIL NFR BLD AUTO: 0.9 %
HCT VFR BLD CALC: 36.1 %
HGB BLD-MCNC: 12.8 G/DL
IMM GRANULOCYTES NFR BLD AUTO: 1.3 %
LYMPHOCYTES # BLD AUTO: 1 K/UL
LYMPHOCYTES NFR BLD AUTO: 21.9 %
MAN DIFF?: NORMAL
MCHC RBC-ENTMCNC: 35.5 G/DL
MCHC RBC-ENTMCNC: 47.9 PG
MCV RBC AUTO: 135.2 FL
MONOCYTES # BLD AUTO: 0.67 K/UL
MONOCYTES NFR BLD AUTO: 14.7 %
NEUTROPHILS # BLD AUTO: 2.79 K/UL
NEUTROPHILS NFR BLD AUTO: 61 %
PLATELET # BLD AUTO: 322 K/UL
RBC # BLD: 2.67 M/UL
RBC # FLD: 14.3 %
WBC # FLD AUTO: 4.57 K/UL

## 2022-11-28 PROCEDURE — 99213 OFFICE O/P EST LOW 20 MIN: CPT

## 2022-11-29 LAB
FERRITIN SERPL-MCNC: 302 NG/ML
FOLATE SERPL-MCNC: >20 NG/ML
VIT B12 SERPL-MCNC: 685 PG/ML

## 2022-11-29 NOTE — REVIEW OF SYSTEMS
[Negative] : Heme/Lymph [Joint Pain] : joint pain [Difficulty Walking] : difficulty walking [Abdominal Pain] : no abdominal pain [Constipation] : no constipation [Diarrhea] : no diarrhea

## 2022-11-29 NOTE — ASSESSMENT
[FreeTextEntry1] : Essential thrombocythemia controlled with hydrea , hereditary hemochromatosis phlebotomy with infrequent phlebotomy .\par \par WBC 4.57, hg 12.8 g/dL, plt 322 , last ferritin 363, today's pending\par \par Plan ; Continue hydrea 2 tabs daily. Also on ASA 81 and Plavix \par           Will check Ferritin as well. \par           Will arrange for phlebotomy accordingly\par           F/u with PCP and orthopedics for back pain and difficulty ambulating; if a procedure is planned he was advised to inform us to discuss AC/AP\par \par RTC in 4 months., \par \par Patient was seen and examined and discussed with Dr Herrera.

## 2022-11-29 NOTE — HISTORY OF PRESENT ILLNESS
[de-identified] : The patient returns for follow-up , he complains of right shoulder pain with decreased range of motion. Still with episodes of positional vertigo at night . ferritin was 200 in March prior to last phlebotomy. \par \par 06/26/2018 : Patient returns for follow up , he had phlebotomy twice after last visit . He reports sensation of fullness ( described as bubble ) in  epigastric and lower sternum , unrelated to food or exertion.\par \par 03/12/2019 Patient returns for follow up , CBC is normal . he denies any new complaints except for asymptomatic sinus bradycardia , feels he is not ready for pacemaker , also mild knee pain , to follow with ortho. \par \par \par 09/10/2019 Patient returns for follow up , last phlebotomy in June . he offers no new complaints . he continues with easy bruising and is stable from cardia standpoint . \par \par 05/06/2021 Patient returns for follow up , he had his last phlebotomy in 2021 , ferritin is stable since under 250 , CBC Is stable on hydrea . He is having more issues with postural dizziness , vertigo , he fell once and injured right shoulder , no xray done and is on muscle relaxants with some relief. In addition he suffered low back compression fracture during heavy lifting \par \par 2/14/22- Patient returns for f/u visit for management of Hereditary hemochromatosis and essential thrombocythemia. He is on hydrea 1000 mg daily . He is also on ASA 81 and plavix. He is doing well with no new complaints. No fever, chills, night sweats, weight loss. His last phlebotomy was about a year ago. \par \par 8/29/22: Patient returns for followup of his ET and hereditary hemochromatosis. He remains on 1000mg of hydrea. His platelets today are 290, hg 12.4 g/dL. Last ferritin 299 from Feb 2022.He had a fall which resulted in compression fractures at L1, L2 and L4. MR also showed multiple disc bulges, foraminal narrowing, spinal stenosis and degenerative changes. He is having significant back pain. He has an appointment with orthopedics tomorrow. Additionally, he has lost about 20 lbs since this occurred. His BP decreased as well and his cardiologist held his HTN medications. \par \par 11/28/22: Patient returns for followup of his ET and hereditary hemochromatosis, on hydrea 1000mg. CBC today shows WBC 4.57, hg 12.8 g/dL, plt 322. \par He continues to have chronic back pain and feels unstead on his feet. He has seen orthopedics and per patient they recommended conservative management. Additionally, he feels light headed when he stands. His lisinopril and ranexa were stopped, and he is now on metoprolol now.  [de-identified] : \par 76/M presenting for follow-up for essential thrombocythemia and hemochromatosis. He is on Hydrea 1500 mg daily, tolerating well. His last phlebotomy was in March 2017. \par He has History of DJD s/p knee arthroplasty , chronic shoulder pain, CAD , aortic stenosis , CHF , positional vertigo worse when turning in bed. hypertension, regimen changed in May2017 .

## 2022-11-29 NOTE — PHYSICAL EXAM
[Normal] : normal appearance, no rash, nodules, vesicles, ulcers, erythema [de-identified] : grade 2/6 systolic murmur. Has PPM/ [de-identified] : no organomegaly. no tenderness.  [de-identified] : Chronic arthritis

## 2022-12-06 ENCOUNTER — APPOINTMENT (OUTPATIENT)
Dept: CARDIOLOGY | Facility: CLINIC | Age: 82
End: 2022-12-06
Payer: COMMERCIAL

## 2022-12-06 ENCOUNTER — NON-APPOINTMENT (OUTPATIENT)
Age: 82
End: 2022-12-06

## 2022-12-06 PROCEDURE — 93296 REM INTERROG EVL PM/IDS: CPT

## 2022-12-06 PROCEDURE — 93294 REM INTERROG EVL PM/LDLS PM: CPT

## 2023-03-07 ENCOUNTER — APPOINTMENT (OUTPATIENT)
Dept: CARDIOLOGY | Facility: CLINIC | Age: 83
End: 2023-03-07

## 2023-03-07 ENCOUNTER — FORM ENCOUNTER (OUTPATIENT)
Age: 83
End: 2023-03-07

## 2023-03-09 ENCOUNTER — APPOINTMENT (OUTPATIENT)
Dept: HEMATOLOGY ONCOLOGY | Facility: CLINIC | Age: 83
End: 2023-03-09
Payer: COMMERCIAL

## 2023-03-09 ENCOUNTER — LABORATORY RESULT (OUTPATIENT)
Age: 83
End: 2023-03-09

## 2023-03-09 ENCOUNTER — OUTPATIENT (OUTPATIENT)
Dept: OUTPATIENT SERVICES | Facility: HOSPITAL | Age: 83
LOS: 1 days | Discharge: HOME | End: 2023-03-09
Payer: COMMERCIAL

## 2023-03-09 ENCOUNTER — APPOINTMENT (OUTPATIENT)
Dept: HEMATOLOGY ONCOLOGY | Facility: CLINIC | Age: 83
End: 2023-03-09

## 2023-03-09 VITALS
TEMPERATURE: 96.9 F | BODY MASS INDEX: 27.44 KG/M2 | SYSTOLIC BLOOD PRESSURE: 176 MMHG | OXYGEN SATURATION: 98 % | RESPIRATION RATE: 16 BRPM | HEIGHT: 71 IN | DIASTOLIC BLOOD PRESSURE: 74 MMHG | WEIGHT: 196 LBS | HEART RATE: 60 BPM

## 2023-03-09 DIAGNOSIS — Z96.653 PRESENCE OF ARTIFICIAL KNEE JOINT, BILATERAL: Chronic | ICD-10-CM

## 2023-03-09 DIAGNOSIS — S99.919A UNSPECIFIED INJURY OF UNSPECIFIED ANKLE, INITIAL ENCOUNTER: Chronic | ICD-10-CM

## 2023-03-09 DIAGNOSIS — I44.2 ATRIOVENTRICULAR BLOCK, COMPLETE: ICD-10-CM

## 2023-03-09 LAB
ALBUMIN SERPL ELPH-MCNC: 4.4 G/DL
ALP BLD-CCNC: 90 U/L
ALT SERPL-CCNC: 19 U/L
ANION GAP SERPL CALC-SCNC: 13 MMOL/L
AST SERPL-CCNC: 28 U/L
BILIRUB SERPL-MCNC: 0.7 MG/DL
BUN SERPL-MCNC: 31 MG/DL
CALCIUM SERPL-MCNC: 9 MG/DL
CHLORIDE SERPL-SCNC: 107 MMOL/L
CO2 SERPL-SCNC: 25 MMOL/L
CREAT SERPL-MCNC: 1.7 MG/DL
EGFR: 39 ML/MIN/1.73M2
GLUCOSE SERPL-MCNC: 103 MG/DL
HCT VFR BLD CALC: 36.7 %
HGB BLD-MCNC: 12.6 G/DL
MCHC RBC-ENTMCNC: 34.3 G/DL
MCHC RBC-ENTMCNC: 45.8 PG
MCV RBC AUTO: 133.5 FL
PLATELET # BLD AUTO: 389 K/UL
PMV BLD: 11.1 FL
POTASSIUM SERPL-SCNC: 5.3 MMOL/L
PROT SERPL-MCNC: 7.3 G/DL
RBC # BLD: 2.75 M/UL
RBC # FLD: 13.4 %
SODIUM SERPL-SCNC: 145 MMOL/L
WBC # FLD AUTO: 4.85 K/UL

## 2023-03-09 PROCEDURE — 99213 OFFICE O/P EST LOW 20 MIN: CPT

## 2023-03-09 PROCEDURE — 82728 ASSAY OF FERRITIN: CPT

## 2023-03-09 PROCEDURE — 80053 COMPREHEN METABOLIC PANEL: CPT

## 2023-03-09 PROCEDURE — 85027 COMPLETE CBC AUTOMATED: CPT

## 2023-03-09 PROCEDURE — 36415 COLL VENOUS BLD VENIPUNCTURE: CPT

## 2023-03-10 DIAGNOSIS — I44.2 ATRIOVENTRICULAR BLOCK, COMPLETE: ICD-10-CM

## 2023-03-10 LAB — FERRITIN SERPL-MCNC: 261 NG/ML

## 2023-03-12 NOTE — ASSESSMENT
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 \par Aortic regurgitation s/p TAVR , CHF ,CAD . \par \par Plan:

## 2023-03-12 NOTE — PHYSICAL EXAM
[Normal] : no JVD, no calf tenderness, venous stasis changes, varices [de-identified] : no edema [de-identified] : grade 2/6 systolic murmur

## 2023-03-12 NOTE — HISTORY OF PRESENT ILLNESS
[de-identified] : \par 76/M presenting for follow-up for essential thrombocythemia and hemochromatosis. He is on Hydrea 1500 mg daily, tolerating well. His last phlebotomy was in March 2017. \par He has History of DJD s/p knee arthroplasty , chronic shoulder pain, CAD , aortic stenosis , CHF , positional vertigo worse when turning in bed. hypertension, regimen changed in May2017 .  [de-identified] : The patient returns for follow-up , he complains of right shoulder pain with decreased range of motion. Still with episodes of positional vertigo at night . ferritin was 200 in March prior to last phlebotomy. \par \par 06/26/2018 : Patient returns for follow up , he had phlebotomy twice after last visit . He reports sensation of fullness ( described as bubble ) in  epigastric and lower sternum , unrelated to food or exertion.\par \par 03/12/2019 Patient returns for follow up , CBC is normal . he denies any new complaints except for asymptomatic sinus bradycardia , feels he is not ready for pacemaker , also mild knee pain , to follow with ortho. \par \par \par 09/10/2019 Patient returns for follow up , last phlebotomy in June . he offers no new complaints . he continues with easy bruising and is stable from cardia standpoint . \par \par 05/06/2021 Patient returns for follow up , he had his last phlebotomy in 2021 , ferritin is stable since under 250 , CBC Is stable on hydrea . He is having more issues with postural dizziness , vertigo , he fell once and injured right shoulder , no xray done and is on muscle relaxants with some relief. In addition he suffered low back compression fracture during heavy lifting \par \par 3/9/2023 Patient returns for follow up complaining of persistent back pain , imbalance , dyspnea on mild exertion . Last blood work was satisfactory , ferritin around 300 ( last phlebotomy in 2019 ) , no edema. He fell at least one partly due to poor vision  in left eye.

## 2023-03-13 DIAGNOSIS — D47.3 ESSENTIAL (HEMORRHAGIC) THROMBOCYTHEMIA: ICD-10-CM

## 2023-04-04 ENCOUNTER — FORM ENCOUNTER (OUTPATIENT)
Age: 83
End: 2023-04-04

## 2023-04-05 ENCOUNTER — APPOINTMENT (OUTPATIENT)
Dept: CARDIOLOGY | Facility: CLINIC | Age: 83
End: 2023-04-05

## 2023-05-08 ENCOUNTER — APPOINTMENT (OUTPATIENT)
Dept: CARDIOLOGY | Facility: CLINIC | Age: 83
End: 2023-05-08

## 2023-07-06 ENCOUNTER — APPOINTMENT (OUTPATIENT)
Dept: HEMATOLOGY ONCOLOGY | Facility: CLINIC | Age: 83
End: 2023-07-06

## 2023-07-13 ENCOUNTER — APPOINTMENT (OUTPATIENT)
Dept: HEMATOLOGY ONCOLOGY | Facility: CLINIC | Age: 83
End: 2023-07-13
Payer: COMMERCIAL

## 2023-07-13 ENCOUNTER — LABORATORY RESULT (OUTPATIENT)
Age: 83
End: 2023-07-13

## 2023-07-13 ENCOUNTER — OUTPATIENT (OUTPATIENT)
Dept: OUTPATIENT SERVICES | Facility: HOSPITAL | Age: 83
LOS: 1 days | End: 2023-07-13
Payer: COMMERCIAL

## 2023-07-13 VITALS
BODY MASS INDEX: 26.6 KG/M2 | OXYGEN SATURATION: 100 % | SYSTOLIC BLOOD PRESSURE: 158 MMHG | HEIGHT: 71 IN | RESPIRATION RATE: 16 BRPM | WEIGHT: 190 LBS | HEART RATE: 80 BPM | TEMPERATURE: 97.1 F | DIASTOLIC BLOOD PRESSURE: 71 MMHG

## 2023-07-13 DIAGNOSIS — S99.919A UNSPECIFIED INJURY OF UNSPECIFIED ANKLE, INITIAL ENCOUNTER: Chronic | ICD-10-CM

## 2023-07-13 DIAGNOSIS — Z96.653 PRESENCE OF ARTIFICIAL KNEE JOINT, BILATERAL: Chronic | ICD-10-CM

## 2023-07-13 DIAGNOSIS — D47.3 ESSENTIAL (HEMORRHAGIC) THROMBOCYTHEMIA: ICD-10-CM

## 2023-07-13 LAB
HCT VFR BLD CALC: 34 %
HGB BLD-MCNC: 11.8 G/DL
MCHC RBC-ENTMCNC: 34.7 G/DL
MCHC RBC-ENTMCNC: 46.1 PG
MCV RBC AUTO: 132.8 FL
PLATELET # BLD AUTO: 278 K/UL
PMV BLD: 11.1 FL
RBC # BLD: 2.56 M/UL
RBC # FLD: 14.3 %
WBC # FLD AUTO: 3.86 K/UL

## 2023-07-13 PROCEDURE — 82728 ASSAY OF FERRITIN: CPT

## 2023-07-13 PROCEDURE — 99213 OFFICE O/P EST LOW 20 MIN: CPT

## 2023-07-13 PROCEDURE — 85027 COMPLETE CBC AUTOMATED: CPT

## 2023-07-13 PROCEDURE — 80053 COMPREHEN METABOLIC PANEL: CPT

## 2023-07-14 DIAGNOSIS — D47.3 ESSENTIAL (HEMORRHAGIC) THROMBOCYTHEMIA: ICD-10-CM

## 2023-07-14 LAB
ALBUMIN SERPL ELPH-MCNC: 4.2 G/DL
ALP BLD-CCNC: 63 U/L
ALT SERPL-CCNC: 18 U/L
ANION GAP SERPL CALC-SCNC: 8 MMOL/L
AST SERPL-CCNC: 27 U/L
BILIRUB SERPL-MCNC: 1 MG/DL
BUN SERPL-MCNC: 29 MG/DL
CALCIUM SERPL-MCNC: 8.6 MG/DL
CHLORIDE SERPL-SCNC: 105 MMOL/L
CO2 SERPL-SCNC: 24 MMOL/L
CREAT SERPL-MCNC: 1.6 MG/DL
EGFR: 42 ML/MIN/1.73M2
FERRITIN SERPL-MCNC: 239 NG/ML
GLUCOSE SERPL-MCNC: 90 MG/DL
POTASSIUM SERPL-SCNC: 4.9 MMOL/L
PROT SERPL-MCNC: 6.9 G/DL
SODIUM SERPL-SCNC: 137 MMOL/L

## 2023-07-14 NOTE — PHYSICAL EXAM
[Normal] : no JVD, no calf tenderness, venous stasis changes, varices [de-identified] : grade 2/6 systolic murmur [de-identified] : no edema

## 2023-07-14 NOTE — HISTORY OF PRESENT ILLNESS
[de-identified] : \par 76/M presenting for follow-up for essential thrombocythemia and hemochromatosis. He is on Hydrea 1500 mg daily, tolerating well. His last phlebotomy was in March 2017. \par He has History of DJD s/p knee arthroplasty , chronic shoulder pain, CAD , aortic stenosis , CHF , positional vertigo worse when turning in bed. hypertension, regimen changed in May2017 .  [de-identified] : The patient returns for follow-up , he complains of right shoulder pain with decreased range of motion. Still with episodes of positional vertigo at night . ferritin was 200 in March prior to last phlebotomy. \par \par 06/26/2018 : Patient returns for follow up , he had phlebotomy twice after last visit . He reports sensation of fullness ( described as bubble ) in  epigastric and lower sternum , unrelated to food or exertion.\par \par 03/12/2019 Patient returns for follow up , CBC is normal . he denies any new complaints except for asymptomatic sinus bradycardia , feels he is not ready for pacemaker , also mild knee pain , to follow with ortho. \par \par \par 09/10/2019 Patient returns for follow up , last phlebotomy in June . he offers no new complaints . he continues with easy bruising and is stable from cardia standpoint . \par \par 05/06/2021 Patient returns for follow up , he had his last phlebotomy in 2021 , ferritin is stable since under 250 , CBC Is stable on hydrea . He is having more issues with postural dizziness , vertigo , he fell once and injured right shoulder , no xray done and is on muscle relaxants with some relief. In addition he suffered low back compression fracture during heavy lifting \par \par 3/9/2023 Patient returns for follow up complaining of persistent back pain , imbalance , dyspnea on mild exertion . Last blood work was satisfactory , ferritin around 300 ( last phlebotomy in 2019 ) , no edema. He fell at least one partly due to poor vision  in left eye. \par \par 7/13/2023 Patient returns for follow up , he is on hydrea 1000 mg daily . he is stable from cardiac standpoint with dyspnea on exertion , mild postural dizziness. . no edema ,

## 2023-07-14 NOTE — ASSESSMENT
[FreeTextEntry1] : Essential thrombocythemia controlled with hydrea , hereditary hemochromatosis no longer on phlebotomy .\par CBc reviewed , Hgb and wbc slightly decreased. 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 \par Aortic regurgitation s/p TAVR , CHF ,CAD . \par \par Plan: change hydrea to 1000/500 , repeat ferritin \par          follow up in 3 months .

## 2023-07-17 DIAGNOSIS — E83.110 HEREDITARY HEMOCHROMATOSIS: ICD-10-CM

## 2023-10-12 ENCOUNTER — APPOINTMENT (OUTPATIENT)
Dept: HEMATOLOGY ONCOLOGY | Facility: CLINIC | Age: 83
End: 2023-10-12
Payer: COMMERCIAL

## 2023-10-12 ENCOUNTER — LABORATORY RESULT (OUTPATIENT)
Age: 83
End: 2023-10-12

## 2023-10-12 ENCOUNTER — OUTPATIENT (OUTPATIENT)
Dept: OUTPATIENT SERVICES | Facility: HOSPITAL | Age: 83
LOS: 1 days | End: 2023-10-12
Payer: COMMERCIAL

## 2023-10-12 VITALS
HEIGHT: 71 IN | DIASTOLIC BLOOD PRESSURE: 82 MMHG | BODY MASS INDEX: 27.58 KG/M2 | HEART RATE: 84 BPM | SYSTOLIC BLOOD PRESSURE: 138 MMHG | RESPIRATION RATE: 16 BRPM | TEMPERATURE: 97.9 F | WEIGHT: 197 LBS | OXYGEN SATURATION: 98 %

## 2023-10-12 DIAGNOSIS — Z96.653 PRESENCE OF ARTIFICIAL KNEE JOINT, BILATERAL: Chronic | ICD-10-CM

## 2023-10-12 DIAGNOSIS — E83.110 HEREDITARY HEMOCHROMATOSIS: ICD-10-CM

## 2023-10-12 DIAGNOSIS — D47.3 ESSENTIAL (HEMORRHAGIC) THROMBOCYTHEMIA: ICD-10-CM

## 2023-10-12 DIAGNOSIS — S99.919A UNSPECIFIED INJURY OF UNSPECIFIED ANKLE, INITIAL ENCOUNTER: Chronic | ICD-10-CM

## 2023-10-12 LAB
HCT VFR BLD CALC: 38.1 %
HGB BLD-MCNC: 12.8 G/DL
MCHC RBC-ENTMCNC: 33.6 G/DL
MCHC RBC-ENTMCNC: 46.2 PG
MCV RBC AUTO: 137.5 FL
PLATELET # BLD AUTO: 434 K/UL
PMV BLD: 11.5 FL
RBC # BLD: 2.77 M/UL
RBC # FLD: 13.7 %
WBC # FLD AUTO: 5.63 K/UL

## 2023-10-12 PROCEDURE — 99213 OFFICE O/P EST LOW 20 MIN: CPT

## 2023-10-12 PROCEDURE — 36415 COLL VENOUS BLD VENIPUNCTURE: CPT

## 2023-10-12 PROCEDURE — 85027 COMPLETE CBC AUTOMATED: CPT

## 2024-01-09 ENCOUNTER — APPOINTMENT (OUTPATIENT)
Dept: HEMATOLOGY ONCOLOGY | Facility: CLINIC | Age: 84
End: 2024-01-09

## 2024-01-09 ENCOUNTER — OUTPATIENT (OUTPATIENT)
Dept: OUTPATIENT SERVICES | Facility: HOSPITAL | Age: 84
LOS: 1 days | End: 2024-01-09
Payer: COMMERCIAL

## 2024-01-09 ENCOUNTER — LABORATORY RESULT (OUTPATIENT)
Age: 84
End: 2024-01-09

## 2024-01-09 DIAGNOSIS — Z96.653 PRESENCE OF ARTIFICIAL KNEE JOINT, BILATERAL: Chronic | ICD-10-CM

## 2024-01-09 DIAGNOSIS — D64.9 ANEMIA, UNSPECIFIED: ICD-10-CM

## 2024-01-09 DIAGNOSIS — S99.919A UNSPECIFIED INJURY OF UNSPECIFIED ANKLE, INITIAL ENCOUNTER: Chronic | ICD-10-CM

## 2024-01-09 LAB
HCT VFR BLD CALC: 37.8 %
HGB BLD-MCNC: 13.2 G/DL
MCHC RBC-ENTMCNC: 34.9 G/DL
MCHC RBC-ENTMCNC: 45.5 PG
MCV RBC AUTO: 130.3 FL
PLATELET # BLD AUTO: 414 K/UL
PMV BLD: 11.2 FL
RBC # BLD: 2.9 M/UL
RBC # FLD: 13.8 %
WBC # FLD AUTO: 5.59 K/UL

## 2024-01-09 PROCEDURE — 85027 COMPLETE CBC AUTOMATED: CPT

## 2024-01-09 PROCEDURE — 36415 COLL VENOUS BLD VENIPUNCTURE: CPT

## 2024-01-09 PROCEDURE — 80053 COMPREHEN METABOLIC PANEL: CPT

## 2024-01-09 PROCEDURE — 82728 ASSAY OF FERRITIN: CPT

## 2024-01-10 DIAGNOSIS — D64.9 ANEMIA, UNSPECIFIED: ICD-10-CM

## 2024-01-10 LAB
ALBUMIN SERPL ELPH-MCNC: 4.2 G/DL
ALP BLD-CCNC: 73 U/L
ALT SERPL-CCNC: 20 U/L
ANION GAP SERPL CALC-SCNC: 10 MMOL/L
AST SERPL-CCNC: 32 U/L
BILIRUB SERPL-MCNC: 0.9 MG/DL
BUN SERPL-MCNC: 31 MG/DL
CALCIUM SERPL-MCNC: 9.1 MG/DL
CHLORIDE SERPL-SCNC: 103 MMOL/L
CO2 SERPL-SCNC: 25 MMOL/L
CREAT SERPL-MCNC: 1.8 MG/DL
EGFR: 36 ML/MIN/1.73M2
FERRITIN SERPL-MCNC: 204 NG/ML
GLUCOSE SERPL-MCNC: 95 MG/DL
POTASSIUM SERPL-SCNC: 5.3 MMOL/L
PROT SERPL-MCNC: 7.5 G/DL
SODIUM SERPL-SCNC: 138 MMOL/L

## 2024-04-16 ENCOUNTER — OUTPATIENT (OUTPATIENT)
Dept: OUTPATIENT SERVICES | Facility: HOSPITAL | Age: 84
LOS: 1 days | End: 2024-04-16
Payer: COMMERCIAL

## 2024-04-16 ENCOUNTER — APPOINTMENT (OUTPATIENT)
Age: 84
End: 2024-04-16
Payer: MEDICARE

## 2024-04-16 ENCOUNTER — LABORATORY RESULT (OUTPATIENT)
Age: 84
End: 2024-04-16

## 2024-04-16 VITALS
WEIGHT: 196 LBS | BODY MASS INDEX: 26.55 KG/M2 | HEART RATE: 64 BPM | SYSTOLIC BLOOD PRESSURE: 143 MMHG | HEIGHT: 72 IN | OXYGEN SATURATION: 98 % | RESPIRATION RATE: 16 BRPM | DIASTOLIC BLOOD PRESSURE: 74 MMHG

## 2024-04-16 DIAGNOSIS — Z96.653 PRESENCE OF ARTIFICIAL KNEE JOINT, BILATERAL: Chronic | ICD-10-CM

## 2024-04-16 DIAGNOSIS — E87.5 HYPERKALEMIA: ICD-10-CM

## 2024-04-16 DIAGNOSIS — E83.110 HEREDITARY HEMOCHROMATOSIS: ICD-10-CM

## 2024-04-16 DIAGNOSIS — D64.9 ANEMIA, UNSPECIFIED: ICD-10-CM

## 2024-04-16 DIAGNOSIS — S99.919A UNSPECIFIED INJURY OF UNSPECIFIED ANKLE, INITIAL ENCOUNTER: Chronic | ICD-10-CM

## 2024-04-16 PROCEDURE — 99214 OFFICE O/P EST MOD 30 MIN: CPT

## 2024-04-16 PROCEDURE — 80053 COMPREHEN METABOLIC PANEL: CPT

## 2024-04-16 PROCEDURE — 85027 COMPLETE CBC AUTOMATED: CPT

## 2024-04-16 PROCEDURE — 83615 LACTATE (LD) (LDH) ENZYME: CPT

## 2024-04-17 DIAGNOSIS — D64.9 ANEMIA, UNSPECIFIED: ICD-10-CM

## 2024-04-17 PROBLEM — E87.5 HYPERKALEMIA: Status: ACTIVE | Noted: 2024-04-17

## 2024-04-17 LAB
ALBUMIN SERPL ELPH-MCNC: 4.4 G/DL
ALP BLD-CCNC: 80 U/L
ALT SERPL-CCNC: 22 U/L
ANION GAP SERPL CALC-SCNC: 15 MMOL/L
AST SERPL-CCNC: 37 U/L
BILIRUB SERPL-MCNC: 0.8 MG/DL
BUN SERPL-MCNC: 33 MG/DL
CALCIUM SERPL-MCNC: 9.2 MG/DL
CHLORIDE SERPL-SCNC: 106 MMOL/L
CO2 SERPL-SCNC: 20 MMOL/L
CREAT SERPL-MCNC: 1.8 MG/DL
EGFR: 36 ML/MIN/1.73M2
GLUCOSE SERPL-MCNC: 96 MG/DL
HCT VFR BLD CALC: 40.8 %
HGB BLD-MCNC: 13.8 G/DL
LDH SERPL-CCNC: 713 U/L
MCHC RBC-ENTMCNC: 33.8 G/DL
MCHC RBC-ENTMCNC: 43.5 PG
MCV RBC AUTO: 128.7 FL
PLATELET # BLD AUTO: 644 K/UL
PMV BLD: 11.1 FL
POTASSIUM SERPL-SCNC: 6.2 MMOL/L
PROT SERPL-MCNC: 8.1 G/DL
RBC # BLD: 3.17 M/UL
RBC # FLD: 14 %
SODIUM SERPL-SCNC: 141 MMOL/L
WBC # FLD AUTO: 9.42 K/UL

## 2024-04-17 NOTE — PHYSICAL EXAM
[Normal] : no JVD, no calf tenderness, venous stasis changes, varices [de-identified] : grade 2/6 systolic murmur. Irregular rhythm noted on today's exam. [de-identified] : no edema

## 2024-04-17 NOTE — ASSESSMENT
[FreeTextEntry1] : 84M with:  # Essential thrombocythemia controlled with hydrea  # Hereditary hemochromatosis, no longer on phlebotomy  # Suspected new onset of Afib  # Degenerative arthritis of left hip and spine.  # Aortic regurgitation s/p TAVR, CHF, and CAD.  PLAN: --CBC today reviewed. Platelets increased to 644k --Will notify pt increase Hydrea to 1000mg daily to currently 1000/500 --CMP shows K+ of 6.2 likely pseudo from elevated platelets count. However, will ask pt to repeat BMP. --f/u cardiology regarding the suspected new onset of afib. We discussed going to ED for further workup, but he would like to wait until he sees his cardiologist, and he will see him as soon as possible.  --will check immunoelectrophoresis on next blood work given CKD and elevated total protein  RTC in 1 month for CBC and see Dr. Herrera in 3 months.

## 2024-04-17 NOTE — HISTORY OF PRESENT ILLNESS
[de-identified] : The patient returns for follow-up , he complains of right shoulder pain with decreased range of motion. Still with episodes of positional vertigo at night . ferritin was 200 in March prior to last phlebotomy.   06/26/2018 : Patient returns for follow up , he had phlebotomy twice after last visit . He reports sensation of fullness ( described as bubble ) in  epigastric and lower sternum , unrelated to food or exertion.  03/12/2019 Patient returns for follow up , CBC is normal . he denies any new complaints except for asymptomatic sinus bradycardia , feels he is not ready for pacemaker , also mild knee pain , to follow with ortho.    09/10/2019 Patient returns for follow up , last phlebotomy in June . he offers no new complaints . he continues with easy bruising and is stable from cardia standpoint .   05/06/2021 Patient returns for follow up , he had his last phlebotomy in 2021 , ferritin is stable since under 250 , CBC Is stable on hydrea . He is having more issues with postural dizziness , vertigo , he fell once and injured right shoulder , no xray done and is on muscle relaxants with some relief. In addition he suffered low back compression fracture during heavy lifting   3/9/2023 Patient returns for follow up complaining of persistent back pain , imbalance , dyspnea on mild exertion . Last blood work was satisfactory , ferritin around 300 ( last phlebotomy in 2019 ) , no edema. He fell at least one partly due to poor vision  in left eye.   7/13/2023 Patient returns for follow up , he is on hydrea 1000 mg daily . he is stable from cardiac standpoint with dyspnea on exertion , mild postural dizziness. . no edema ,   10/12/2023 Patient reeturns for follow up ,he is on hydrea 1000/500 , he relates no new complaints , last ferritin was stable , no longer on phlebotomy .   4/16/2024: Pt returns for follow-up regarding his ET on Hydra. He reports he has Afib shown on his Apple watch and has yet to see his cardiologist. He further reports SOB on exertion. Otherwise, he has no new complain. He continues to be compliant with his hydrea treatment 1000/500.

## 2024-04-18 ENCOUNTER — APPOINTMENT (OUTPATIENT)
Age: 84
End: 2024-04-18

## 2024-04-18 ENCOUNTER — OUTPATIENT (OUTPATIENT)
Dept: OUTPATIENT SERVICES | Facility: HOSPITAL | Age: 84
LOS: 1 days | End: 2024-04-18
Payer: MEDICARE

## 2024-04-18 DIAGNOSIS — D64.9 ANEMIA, UNSPECIFIED: ICD-10-CM

## 2024-04-18 DIAGNOSIS — Z96.653 PRESENCE OF ARTIFICIAL KNEE JOINT, BILATERAL: Chronic | ICD-10-CM

## 2024-04-18 DIAGNOSIS — S99.919A UNSPECIFIED INJURY OF UNSPECIFIED ANKLE, INITIAL ENCOUNTER: Chronic | ICD-10-CM

## 2024-04-18 LAB
ALBUMIN SERPL ELPH-MCNC: 4.4 G/DL
ALP BLD-CCNC: 101 U/L
ALT SERPL-CCNC: 23 U/L
ANION GAP SERPL CALC-SCNC: 14 MMOL/L
AST SERPL-CCNC: 34 U/L
BILIRUB SERPL-MCNC: 0.7 MG/DL
BUN SERPL-MCNC: 29 MG/DL
CALCIUM SERPL-MCNC: 9.1 MG/DL
CHLORIDE SERPL-SCNC: 104 MMOL/L
CO2 SERPL-SCNC: 22 MMOL/L
CREAT SERPL-MCNC: 1.7 MG/DL
EGFR: 39 ML/MIN/1.73M2
GLUCOSE SERPL-MCNC: 95 MG/DL
POTASSIUM SERPL-SCNC: 5.6 MMOL/L
PROT SERPL-MCNC: 8.1 G/DL
SODIUM SERPL-SCNC: 140 MMOL/L

## 2024-04-18 PROCEDURE — 36415 COLL VENOUS BLD VENIPUNCTURE: CPT

## 2024-04-18 PROCEDURE — 80053 COMPREHEN METABOLIC PANEL: CPT

## 2024-04-22 ENCOUNTER — APPOINTMENT (OUTPATIENT)
Dept: HEMATOLOGY ONCOLOGY | Facility: CLINIC | Age: 84
End: 2024-04-22

## 2024-05-10 ENCOUNTER — APPOINTMENT (OUTPATIENT)
Dept: ELECTROPHYSIOLOGY | Facility: CLINIC | Age: 84
End: 2024-05-10
Payer: COMMERCIAL

## 2024-05-10 VITALS
DIASTOLIC BLOOD PRESSURE: 94 MMHG | HEART RATE: 66 BPM | SYSTOLIC BLOOD PRESSURE: 138 MMHG | BODY MASS INDEX: 26.55 KG/M2 | RESPIRATION RATE: 16 BRPM | WEIGHT: 196 LBS | TEMPERATURE: 97.4 F | HEIGHT: 72 IN

## 2024-05-10 DIAGNOSIS — I44.0 ATRIOVENTRICULAR BLOCK, FIRST DEGREE: ICD-10-CM

## 2024-05-10 DIAGNOSIS — D47.3 ESSENTIAL (HEMORRHAGIC) THROMBOCYTHEMIA: ICD-10-CM

## 2024-05-10 DIAGNOSIS — Z45.018 ENCOUNTER FOR ADJUSTMENT AND MANAGEMENT OF OTHER PART OF CARDIAC PACEMAKER: ICD-10-CM

## 2024-05-10 DIAGNOSIS — I10 ESSENTIAL (PRIMARY) HYPERTENSION: ICD-10-CM

## 2024-05-10 DIAGNOSIS — E83.110 HEREDITARY HEMOCHROMATOSIS: ICD-10-CM

## 2024-05-10 DIAGNOSIS — I25.10 ATHEROSCLEROTIC HEART DISEASE OF NATIVE CORONARY ARTERY W/OUT ANGINA PECTORIS: ICD-10-CM

## 2024-05-10 PROCEDURE — 93280 PM DEVICE PROGR EVAL DUAL: CPT

## 2024-05-10 PROCEDURE — 93000 ELECTROCARDIOGRAM COMPLETE: CPT | Mod: 59

## 2024-05-10 PROCEDURE — 99215 OFFICE O/P EST HI 40 MIN: CPT | Mod: 25

## 2024-05-10 RX ORDER — HYDROXYUREA 500 MG/1
500 CAPSULE ORAL DAILY
Qty: 180 | Refills: 1 | Status: ACTIVE | COMMUNITY
Start: 2021-04-16

## 2024-05-10 RX ORDER — FOLIC ACID 1 MG/1
1 TABLET ORAL
Refills: 0 | Status: ACTIVE | COMMUNITY

## 2024-05-10 RX ORDER — ROSUVASTATIN CALCIUM 5 MG/1
5 TABLET, FILM COATED ORAL AT BEDTIME
Refills: 0 | Status: ACTIVE | COMMUNITY
Start: 2020-09-08

## 2024-05-10 RX ORDER — LISINOPRIL 10 MG/1
10 TABLET ORAL DAILY
Refills: 0 | Status: COMPLETED | COMMUNITY
End: 2024-05-10

## 2024-05-10 RX ORDER — RANOLAZINE 500 MG/1
500 TABLET, FILM COATED, EXTENDED RELEASE ORAL
Refills: 0 | Status: COMPLETED | COMMUNITY
End: 2024-05-10

## 2024-05-10 RX ORDER — METHYLPREDNISOLONE 4 MG/1
4 TABLET ORAL
Qty: 21 | Refills: 0 | Status: COMPLETED | COMMUNITY
Start: 2022-07-14 | End: 2024-05-10

## 2024-05-10 RX ORDER — CHROMIUM 200 MCG
TABLET ORAL
Refills: 0 | Status: ACTIVE | COMMUNITY

## 2024-05-10 NOTE — HISTORY OF PRESENT ILLNESS
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      Patient underwent TAVR in 2020 followed by intermittent complete AV block. He underwent pacemaker implant on 09/02/2020. Patient was on remote monitoring until 2022, when he disconnected his monitor. On 04/16/2024, he got an alert from Apple watch for possible AFib. He was started on Eliquis for possible stroke prevention and was referred to EP for evaluation.    Device interrogation in the office showed no evidence of atrial fibrillation.  Patient has no chest pain, no shortness of breath at rest, no dyspnea, and no palpitation. He had one episode of fall a year ago. He has intermittent dizziness. He has decreased vision in one eye. He presents for evaluation.

## 2024-05-10 NOTE — ADDENDUM
[FreeTextEntry1] : Josefina DUNAWAY assisted in documentation on 05/10/2024   acting as a scribe for Dr. Nirmal Landis.  Cardiac

## 2024-05-10 NOTE — DISCUSSION/SUMMARY
[EKG obtained to assist in diagnosis and management of assessed problem(s)] : EKG obtained to assist in diagnosis and management of assessed problem(s) [FreeTextEntry1] : Mr. Sebastian Nielsen is a pleasant 84-year-old man with hypertension, hyperlipidemia, CAD s/p PCI, AS s/p TAVR, hemochromatosis, essential thrombocytosis, s/p pacemaker in 09/02/2020 post TAVR, prolonged first-degree AV block, and recent history of arrhythmias.   Patient had alert from Apple watch for possible AFib on 04/16/2024. He saw his cardiologist and his PCP and was started on Eliquis for stroke prevention. Device interrogation done in great detail by me in the office today showed no evidence of atrial fibrillation. I reviewed the episodes of Apple watch labeled as AFib. Episodes are consistent with sinus rhythm with RV pacing and frequent APCs and PVCs. Patient has no evidence for atrial fibrillation.   In the absence of AFib, I recommend to stop Eliquis. I recommend to continue Aspirin and Plavix.   I discussed with patient at length the importance of using remote monitoring to evaluate device in between office visits. Patient was on remote monitoring until 2022. Patient agrees to re-enroll in remote monitoring. Monitor provided to patient in the office today.   I recommend to continue the same medication.  I interrogated and reprogrammed pacemaker in person. I checked thresholds and reprogrammed outputs accordingly.  .[facility] [[email or web address]]  Patient will follow-up with me in 6 months.  Device interrogation summary.  Program DDD . Atrial lead impedance 323 ohms. RV lead impedance 380 ohms.  Capture threshold. Atrium 0.75 volts at 0.4 milliseconds. Right ventricle 0.625 volts at 0.4 milliseconds.   Measure P/R wave. P wave 4.6 millivolts. R wave 12.8 millivolts.   Events: no significant arrhythmias.

## 2024-05-10 NOTE — CARDIOLOGY SUMMARY
[de-identified] : (05/10/2024): ECG. Sinus rhythm at 66 bpm, RV paced rhythm. [de-identified] :  (09/02/2020): Dual chamber pacemaker implant. [employer], Cathie (implanted by Dr. Israel)

## 2024-05-10 NOTE — REASON FOR VISIT
[Arrhythmia/ECG Abnorrmalities] : arrhythmia/ECG abnormalities [FreeTextEntry3] : Dr. Kay Cason - Dr. Chivo Blake

## 2024-05-10 NOTE — END OF VISIT
[Time Spent: ___ minutes] : I have spent [unfilled] minutes of time on the encounter. [FreeTextEntry3] : I, Nirmal Landis, personally performed the services described in this documentation. All medical record entries made by the scribe/nurse CTA were at my direction and in my presence. I have reviewed the chart and agree that the record reflects my personal performance and is accurate and complete.

## 2024-05-15 ENCOUNTER — OUTPATIENT (OUTPATIENT)
Dept: OUTPATIENT SERVICES | Facility: HOSPITAL | Age: 84
LOS: 1 days | End: 2024-05-15
Payer: MEDICARE

## 2024-05-15 ENCOUNTER — APPOINTMENT (OUTPATIENT)
Age: 84
End: 2024-05-15

## 2024-05-15 ENCOUNTER — LABORATORY RESULT (OUTPATIENT)
Age: 84
End: 2024-05-15

## 2024-05-15 DIAGNOSIS — S99.919A UNSPECIFIED INJURY OF UNSPECIFIED ANKLE, INITIAL ENCOUNTER: Chronic | ICD-10-CM

## 2024-05-15 DIAGNOSIS — Z96.653 PRESENCE OF ARTIFICIAL KNEE JOINT, BILATERAL: Chronic | ICD-10-CM

## 2024-05-15 DIAGNOSIS — D47.3 ESSENTIAL (HEMORRHAGIC) THROMBOCYTHEMIA: ICD-10-CM

## 2024-05-15 LAB
HCT VFR BLD CALC: 35.7 %
HGB BLD-MCNC: 12.1 G/DL
MCHC RBC-ENTMCNC: 33.9 G/DL
MCHC RBC-ENTMCNC: 42.8 PG
MCV RBC AUTO: 126.1 FL
PLATELET # BLD AUTO: 397 K/UL
PMV BLD: 11.7 FL
RBC # BLD: 2.83 M/UL
RBC # FLD: 14.7 %
WBC # FLD AUTO: 6.9 K/UL

## 2024-05-15 PROCEDURE — 36415 COLL VENOUS BLD VENIPUNCTURE: CPT

## 2024-05-15 PROCEDURE — 80053 COMPREHEN METABOLIC PANEL: CPT

## 2024-05-15 PROCEDURE — 85027 COMPLETE CBC AUTOMATED: CPT

## 2024-05-15 PROCEDURE — 83615 LACTATE (LD) (LDH) ENZYME: CPT

## 2024-05-16 DIAGNOSIS — D47.3 ESSENTIAL (HEMORRHAGIC) THROMBOCYTHEMIA: ICD-10-CM

## 2024-05-16 LAB
ALBUMIN SERPL ELPH-MCNC: 4.4 G/DL
ALP BLD-CCNC: 78 U/L
ALT SERPL-CCNC: 19 U/L
ANION GAP SERPL CALC-SCNC: 9 MMOL/L
AST SERPL-CCNC: 34 U/L
BILIRUB SERPL-MCNC: 0.8 MG/DL
BUN SERPL-MCNC: 34 MG/DL
CALCIUM SERPL-MCNC: 8.9 MG/DL
CHLORIDE SERPL-SCNC: 105 MMOL/L
CO2 SERPL-SCNC: 26 MMOL/L
CREAT SERPL-MCNC: 1.7 MG/DL
EGFR: 39 ML/MIN/1.73M2
GLUCOSE SERPL-MCNC: 98 MG/DL
LDH SERPL-CCNC: 640 U/L
POTASSIUM SERPL-SCNC: 4.9 MMOL/L
PROT SERPL-MCNC: 7.5 G/DL
SODIUM SERPL-SCNC: 140 MMOL/L

## 2024-05-17 RX ORDER — CLOPIDOGREL BISULFATE 75 MG/1
75 TABLET, FILM COATED ORAL
Qty: 90 | Refills: 1 | Status: ACTIVE | COMMUNITY
Start: 2024-05-17 | End: 1900-01-01

## 2024-05-17 RX ORDER — METOPROLOL TARTRATE 25 MG/1
25 TABLET, FILM COATED ORAL
Refills: 0 | Status: ACTIVE | COMMUNITY

## 2024-07-16 ENCOUNTER — APPOINTMENT (OUTPATIENT)
Age: 84
End: 2024-07-16

## 2024-07-16 ENCOUNTER — LABORATORY RESULT (OUTPATIENT)
Age: 84
End: 2024-07-16

## 2024-07-16 ENCOUNTER — OUTPATIENT (OUTPATIENT)
Dept: OUTPATIENT SERVICES | Facility: HOSPITAL | Age: 84
LOS: 1 days | End: 2024-07-16
Payer: MEDICARE

## 2024-07-16 VITALS
SYSTOLIC BLOOD PRESSURE: 164 MMHG | TEMPERATURE: 97.7 F | BODY MASS INDEX: 24.78 KG/M2 | HEIGHT: 73 IN | WEIGHT: 187 LBS | HEART RATE: 58 BPM | OXYGEN SATURATION: 99 % | RESPIRATION RATE: 16 BRPM | DIASTOLIC BLOOD PRESSURE: 73 MMHG

## 2024-07-16 DIAGNOSIS — E83.110 HEREDITARY HEMOCHROMATOSIS: ICD-10-CM

## 2024-07-16 DIAGNOSIS — D47.3 ESSENTIAL (HEMORRHAGIC) THROMBOCYTHEMIA: ICD-10-CM

## 2024-07-16 DIAGNOSIS — S99.919A UNSPECIFIED INJURY OF UNSPECIFIED ANKLE, INITIAL ENCOUNTER: Chronic | ICD-10-CM

## 2024-07-16 LAB
ALBUMIN SERPL ELPH-MCNC: 4.3 G/DL
ALP BLD-CCNC: 78 U/L
ALT SERPL-CCNC: 18 U/L
ANION GAP SERPL CALC-SCNC: 11 MMOL/L
AST SERPL-CCNC: 32 U/L
BILIRUB SERPL-MCNC: 0.8 MG/DL
BUN SERPL-MCNC: 23 MG/DL
CALCIUM SERPL-MCNC: 8.8 MG/DL
CHLORIDE SERPL-SCNC: 105 MMOL/L
CO2 SERPL-SCNC: 24 MMOL/L
CREAT SERPL-MCNC: 1.5 MG/DL
EGFR: 45 ML/MIN/1.73M2
GLUCOSE SERPL-MCNC: 84 MG/DL
HCT VFR BLD CALC: 37 %
HGB BLD-MCNC: 12.7 G/DL
LDH SERPL-CCNC: 634 U/L
MCHC RBC-ENTMCNC: 34.3 G/DL
MCHC RBC-ENTMCNC: 44.1 PG
MCV RBC AUTO: 128.5 FL
PLATELET # BLD AUTO: 463 K/UL
PMV BLD: 11.2 FL
POTASSIUM SERPL-SCNC: 5.2 MMOL/L
PROT SERPL-MCNC: 7.6 G/DL
RBC # BLD: 2.88 M/UL
RBC # FLD: 15.8 %
SODIUM SERPL-SCNC: 140 MMOL/L
WBC # FLD AUTO: 5.5 K/UL

## 2024-07-16 PROCEDURE — 99213 OFFICE O/P EST LOW 20 MIN: CPT

## 2024-07-16 PROCEDURE — 83615 LACTATE (LD) (LDH) ENZYME: CPT

## 2024-07-16 PROCEDURE — 85027 COMPLETE CBC AUTOMATED: CPT

## 2024-07-16 PROCEDURE — 80053 COMPREHEN METABOLIC PANEL: CPT

## 2024-07-17 DIAGNOSIS — E83.110 HEREDITARY HEMOCHROMATOSIS: ICD-10-CM

## 2024-08-09 ENCOUNTER — APPOINTMENT (OUTPATIENT)
Dept: CARDIOLOGY | Facility: CLINIC | Age: 84
End: 2024-08-09

## 2024-09-16 ENCOUNTER — APPOINTMENT (OUTPATIENT)
Dept: CARDIOLOGY | Facility: CLINIC | Age: 84
End: 2024-09-16

## 2024-10-15 ENCOUNTER — APPOINTMENT (OUTPATIENT)
Age: 84
End: 2024-10-15

## 2024-10-15 ENCOUNTER — OUTPATIENT (OUTPATIENT)
Dept: OUTPATIENT SERVICES | Facility: HOSPITAL | Age: 84
LOS: 1 days | End: 2024-10-15
Payer: MEDICARE

## 2024-10-15 ENCOUNTER — LABORATORY RESULT (OUTPATIENT)
Age: 84
End: 2024-10-15

## 2024-10-15 DIAGNOSIS — S99.919A UNSPECIFIED INJURY OF UNSPECIFIED ANKLE, INITIAL ENCOUNTER: Chronic | ICD-10-CM

## 2024-10-15 DIAGNOSIS — E83.110 HEREDITARY HEMOCHROMATOSIS: ICD-10-CM

## 2024-10-15 DIAGNOSIS — Z96.653 PRESENCE OF ARTIFICIAL KNEE JOINT, BILATERAL: Chronic | ICD-10-CM

## 2024-10-15 LAB
HCT VFR BLD CALC: 38.4 %
HGB BLD-MCNC: 13.1 G/DL
MCHC RBC-ENTMCNC: 34.1 G/DL
MCHC RBC-ENTMCNC: 44.3 PG
MCV RBC AUTO: 129.7 FL
PLATELET # BLD AUTO: 469 K/UL
PMV BLD: 11.1 FL
RBC # BLD: 2.96 M/UL
RBC # FLD: 14.9 %
WBC # FLD AUTO: 5.89 K/UL

## 2024-10-15 PROCEDURE — 84155 ASSAY OF PROTEIN SERUM: CPT

## 2024-10-15 PROCEDURE — 84165 PROTEIN E-PHORESIS SERUM: CPT

## 2024-10-15 PROCEDURE — 86334 IMMUNOFIX E-PHORESIS SERUM: CPT

## 2024-10-15 PROCEDURE — 36415 COLL VENOUS BLD VENIPUNCTURE: CPT

## 2024-10-15 PROCEDURE — 85027 COMPLETE CBC AUTOMATED: CPT

## 2024-10-16 DIAGNOSIS — E83.110 HEREDITARY HEMOCHROMATOSIS: ICD-10-CM

## 2024-10-18 LAB
ALBUMIN MFR SERPL ELPH: 54.2 %
ALBUMIN SERPL-MCNC: 4.3 G/DL
ALBUMIN/GLOB SERPL: 1.2 RATIO
ALPHA1 GLOB MFR SERPL ELPH: 3.7 %
ALPHA1 GLOB SERPL ELPH-MCNC: 0.3 G/DL
ALPHA2 GLOB MFR SERPL ELPH: 8.8 %
ALPHA2 GLOB SERPL ELPH-MCNC: 0.7 G/DL
B-GLOBULIN MFR SERPL ELPH: 11.6 %
B-GLOBULIN SERPL ELPH-MCNC: 0.9 G/DL
GAMMA GLOB FLD ELPH-MCNC: 1.7 G/DL
GAMMA GLOB MFR SERPL ELPH: 21.7 %
INTERPRETATION SERPL IEP-IMP: NORMAL
M PROTEIN SPEC IFE-MCNC: NORMAL
PROT SERPL-MCNC: 7.9 G/DL
PROT SERPL-MCNC: 7.9 G/DL

## 2024-10-21 ENCOUNTER — APPOINTMENT (OUTPATIENT)
Dept: CARDIOLOGY | Facility: CLINIC | Age: 84
End: 2024-10-21

## 2024-10-28 ENCOUNTER — APPOINTMENT (OUTPATIENT)
Dept: ELECTROPHYSIOLOGY | Facility: CLINIC | Age: 84
End: 2024-10-28

## 2025-01-13 ENCOUNTER — LABORATORY RESULT (OUTPATIENT)
Age: 85
End: 2025-01-13

## 2025-01-13 ENCOUNTER — OUTPATIENT (OUTPATIENT)
Dept: OUTPATIENT SERVICES | Facility: HOSPITAL | Age: 85
LOS: 1 days | End: 2025-01-13
Payer: MEDICARE

## 2025-01-13 ENCOUNTER — APPOINTMENT (OUTPATIENT)
Age: 85
End: 2025-01-13
Payer: MEDICARE

## 2025-01-13 VITALS
SYSTOLIC BLOOD PRESSURE: 154 MMHG | WEIGHT: 182 LBS | DIASTOLIC BLOOD PRESSURE: 71 MMHG | OXYGEN SATURATION: 100 % | HEART RATE: 76 BPM | RESPIRATION RATE: 16 BRPM | HEIGHT: 73 IN | BODY MASS INDEX: 24.12 KG/M2 | TEMPERATURE: 98.1 F

## 2025-01-13 DIAGNOSIS — S99.919A UNSPECIFIED INJURY OF UNSPECIFIED ANKLE, INITIAL ENCOUNTER: Chronic | ICD-10-CM

## 2025-01-13 DIAGNOSIS — D47.3 ESSENTIAL (HEMORRHAGIC) THROMBOCYTHEMIA: ICD-10-CM

## 2025-01-13 DIAGNOSIS — E83.110 HEREDITARY HEMOCHROMATOSIS: ICD-10-CM

## 2025-01-13 DIAGNOSIS — Z96.653 PRESENCE OF ARTIFICIAL KNEE JOINT, BILATERAL: Chronic | ICD-10-CM

## 2025-01-13 LAB
ALBUMIN SERPL ELPH-MCNC: 3.9 G/DL
ALP BLD-CCNC: 95 U/L
ALT SERPL-CCNC: 23 U/L
ANION GAP SERPL CALC-SCNC: 10 MMOL/L
AST SERPL-CCNC: 34 U/L
BILIRUB SERPL-MCNC: 0.7 MG/DL
BUN SERPL-MCNC: 25 MG/DL
CALCIUM SERPL-MCNC: 8.8 MG/DL
CHLORIDE SERPL-SCNC: 104 MMOL/L
CO2 SERPL-SCNC: 25 MMOL/L
CREAT SERPL-MCNC: 1.6 MG/DL
EGFR: 42 ML/MIN/1.73M2
GLUCOSE SERPL-MCNC: 116 MG/DL
HCT VFR BLD CALC: 35.3 %
HGB BLD-MCNC: 12.4 G/DL
LDH SERPL-CCNC: 515 U/L
MCHC RBC-ENTMCNC: 35.1 G/DL
MCHC RBC-ENTMCNC: 44.6 PG
MCV RBC AUTO: 127 FL
PLATELET # BLD AUTO: 427 K/UL
PMV BLD: 11 FL
POTASSIUM SERPL-SCNC: 4.7 MMOL/L
PROT SERPL-MCNC: 7.4 G/DL
RBC # BLD: 2.78 M/UL
RBC # FLD: 13.8 %
SODIUM SERPL-SCNC: 139 MMOL/L
WBC # FLD AUTO: 5.21 K/UL

## 2025-01-13 PROCEDURE — 99214 OFFICE O/P EST MOD 30 MIN: CPT

## 2025-01-13 PROCEDURE — 84439 ASSAY OF FREE THYROXINE: CPT

## 2025-01-13 PROCEDURE — 80053 COMPREHEN METABOLIC PANEL: CPT

## 2025-01-13 PROCEDURE — 82728 ASSAY OF FERRITIN: CPT

## 2025-01-13 PROCEDURE — 84443 ASSAY THYROID STIM HORMONE: CPT

## 2025-01-13 PROCEDURE — 83615 LACTATE (LD) (LDH) ENZYME: CPT

## 2025-01-13 PROCEDURE — 85027 COMPLETE CBC AUTOMATED: CPT

## 2025-01-14 DIAGNOSIS — E83.110 HEREDITARY HEMOCHROMATOSIS: ICD-10-CM

## 2025-01-14 LAB
FERRITIN SERPL-MCNC: 469 NG/ML
T4 FREE SERPL-MCNC: 1.3 NG/DL
TSH SERPL-ACNC: 1.51 UIU/ML

## 2025-01-27 ENCOUNTER — APPOINTMENT (OUTPATIENT)
Dept: CARDIOLOGY | Facility: CLINIC | Age: 85
End: 2025-01-27

## 2025-04-15 ENCOUNTER — APPOINTMENT (OUTPATIENT)
Age: 85
End: 2025-04-15

## 2025-04-15 ENCOUNTER — OUTPATIENT (OUTPATIENT)
Dept: OUTPATIENT SERVICES | Facility: HOSPITAL | Age: 85
LOS: 1 days | End: 2025-04-15
Payer: MEDICARE

## 2025-04-15 ENCOUNTER — OUTPATIENT (OUTPATIENT)
Dept: OUTPATIENT SERVICES | Facility: HOSPITAL | Age: 85
LOS: 1 days | End: 2025-04-15

## 2025-04-15 DIAGNOSIS — E83.110 HEREDITARY HEMOCHROMATOSIS: ICD-10-CM

## 2025-04-15 DIAGNOSIS — S99.919A UNSPECIFIED INJURY OF UNSPECIFIED ANKLE, INITIAL ENCOUNTER: Chronic | ICD-10-CM

## 2025-04-15 DIAGNOSIS — Z96.653 PRESENCE OF ARTIFICIAL KNEE JOINT, BILATERAL: Chronic | ICD-10-CM

## 2025-04-15 PROCEDURE — 83540 ASSAY OF IRON: CPT

## 2025-04-15 PROCEDURE — 82728 ASSAY OF FERRITIN: CPT

## 2025-04-15 PROCEDURE — 85025 COMPLETE CBC W/AUTO DIFF WBC: CPT

## 2025-04-15 PROCEDURE — 83550 IRON BINDING TEST: CPT

## 2025-04-15 PROCEDURE — 36415 COLL VENOUS BLD VENIPUNCTURE: CPT

## 2025-04-16 DIAGNOSIS — E83.110 HEREDITARY HEMOCHROMATOSIS: ICD-10-CM

## 2025-04-16 LAB
AUTO BASOPHILS #: 0.01 K/UL
AUTO BASOPHILS %: 0.2 %
AUTO EOSINOPHILS #: 0.01 K/UL
AUTO EOSINOPHILS %: 0.2 %
AUTO IMMATURE GRANULOCYTES #: 0.07 K/UL
AUTO LYMPHOCYTES #: 0.94 K/UL
AUTO LYMPHOCYTES %: 20.4 %
AUTO MONOCYTES #: 0.59 K/UL
AUTO MONOCYTES %: 12.8 %
AUTO NEUTROPHILS #: 2.99 K/UL
AUTO NEUTROPHILS %: 64.9 %
AUTO NRBC #: 0 K/UL
FERRITIN SERPL-MCNC: 258 NG/ML
HCT VFR BLD CALC: 35.7 %
HGB BLD-MCNC: 12.1 G/DL
IMM GRANULOCYTES NFR BLD AUTO: 1.5 %
IRON SATN MFR SERPL: 51 %
IRON SERPL-MCNC: 111 UG/DL
MAN DIFF?: NORMAL
MCHC RBC-ENTMCNC: 33.9 G/DL
MCHC RBC-ENTMCNC: 43.4 PG
MCV RBC AUTO: 128 FL
PLATELET # BLD AUTO: 370 K/UL
PMV BLD AUTO: 0 /100 WBCS
PMV BLD: 10.8 FL
RBC # BLD: 2.79 M/UL
RBC # FLD: 16.7 %
TIBC SERPL-MCNC: 218 UG/DL
UIBC SERPL-MCNC: 107 UG/DL
WBC # FLD AUTO: 4.61 K/UL

## 2025-04-28 DIAGNOSIS — E83.110 HEREDITARY HEMOCHROMATOSIS: ICD-10-CM

## 2025-04-29 DIAGNOSIS — E83.110 HEREDITARY HEMOCHROMATOSIS: ICD-10-CM

## 2025-07-18 ENCOUNTER — APPOINTMENT (OUTPATIENT)
Age: 85
End: 2025-07-18

## 2025-07-18 VITALS
SYSTOLIC BLOOD PRESSURE: 146 MMHG | WEIGHT: 178 LBS | HEART RATE: 74 BPM | TEMPERATURE: 98.4 F | DIASTOLIC BLOOD PRESSURE: 65 MMHG | BODY MASS INDEX: 23.48 KG/M2 | RESPIRATION RATE: 16 BRPM

## 2025-07-18 LAB
AUTO BASOPHILS #: 0.01 K/UL
AUTO BASOPHILS %: 0.2 %
AUTO EOSINOPHILS #: 0.03 K/UL
AUTO EOSINOPHILS %: 0.6 %
AUTO IMMATURE GRANULOCYTES #: 0.06 K/UL
AUTO LYMPHOCYTES #: 0.92 K/UL
AUTO LYMPHOCYTES %: 17.1 %
AUTO MONOCYTES #: 0.69 K/UL
AUTO MONOCYTES %: 12.8 %
AUTO NEUTROPHILS #: 3.66 K/UL
AUTO NEUTROPHILS %: 68.2 %
AUTO NRBC #: 0 K/UL
HCT VFR BLD CALC: 35 %
HGB BLD-MCNC: 12.3 G/DL
IMM GRANULOCYTES NFR BLD AUTO: 1.1 %
MAN DIFF?: NORMAL
MCHC RBC-ENTMCNC: 35.1 G/DL
MCHC RBC-ENTMCNC: 46.6 PG
MCV RBC AUTO: 132.6 FL
PLATELET # BLD AUTO: 396 K/UL
PMV BLD AUTO: 0 /100 WBCS
PMV BLD: 11.2 FL
RBC # BLD: 2.64 M/UL
RBC # FLD: 12.7 %
WBC # FLD AUTO: 5.37 K/UL

## 2025-07-18 PROCEDURE — 99213 OFFICE O/P EST LOW 20 MIN: CPT

## 2025-07-21 LAB
FERRITIN SERPL-MCNC: 349 NG/ML
IRON SATN MFR SERPL: 54 %
IRON SERPL-MCNC: 118 UG/DL
TIBC SERPL-MCNC: 219 UG/DL
UIBC SERPL-MCNC: 101 UG/DL